# Patient Record
Sex: FEMALE | Race: BLACK OR AFRICAN AMERICAN | NOT HISPANIC OR LATINO | URBAN - METROPOLITAN AREA
[De-identification: names, ages, dates, MRNs, and addresses within clinical notes are randomized per-mention and may not be internally consistent; named-entity substitution may affect disease eponyms.]

---

## 2020-12-08 ENCOUNTER — EMERGENCY (EMERGENCY)
Facility: HOSPITAL | Age: 35
LOS: 0 days | Discharge: HOME | End: 2020-12-08
Attending: STUDENT IN AN ORGANIZED HEALTH CARE EDUCATION/TRAINING PROGRAM
Payer: COMMERCIAL

## 2020-12-08 VITALS
DIASTOLIC BLOOD PRESSURE: 82 MMHG | RESPIRATION RATE: 20 BRPM | SYSTOLIC BLOOD PRESSURE: 133 MMHG | OXYGEN SATURATION: 98 % | HEART RATE: 88 BPM | TEMPERATURE: 98 F

## 2020-12-08 VITALS
TEMPERATURE: 98 F | SYSTOLIC BLOOD PRESSURE: 112 MMHG | DIASTOLIC BLOOD PRESSURE: 75 MMHG | OXYGEN SATURATION: 97 % | HEART RATE: 79 BPM | WEIGHT: 229.94 LBS | RESPIRATION RATE: 18 BRPM | HEIGHT: 65 IN

## 2020-12-08 DIAGNOSIS — R35.8 OTHER POLYURIA: ICD-10-CM

## 2020-12-08 DIAGNOSIS — E11.9 TYPE 2 DIABETES MELLITUS WITHOUT COMPLICATIONS: ICD-10-CM

## 2020-12-08 DIAGNOSIS — E11.65 TYPE 2 DIABETES MELLITUS WITH HYPERGLYCEMIA: ICD-10-CM

## 2020-12-08 DIAGNOSIS — R63.1 POLYDIPSIA: ICD-10-CM

## 2020-12-08 DIAGNOSIS — Z79.84 LONG TERM (CURRENT) USE OF ORAL HYPOGLYCEMIC DRUGS: ICD-10-CM

## 2020-12-08 LAB
ACETONE SERPL-MCNC: ABNORMAL
ALBUMIN SERPL ELPH-MCNC: 3.8 G/DL — SIGNIFICANT CHANGE UP (ref 3.3–5)
ALP SERPL-CCNC: 101 U/L — SIGNIFICANT CHANGE UP (ref 40–120)
ALT FLD-CCNC: 25 U/L — SIGNIFICANT CHANGE UP (ref 12–78)
AMYLASE P1 CFR SERPL: 63 U/L — SIGNIFICANT CHANGE UP (ref 25–115)
ANION GAP SERPL CALC-SCNC: 9 MMOL/L — SIGNIFICANT CHANGE UP (ref 5–17)
APTT BLD: 30.4 SEC — SIGNIFICANT CHANGE UP (ref 27.5–35.5)
AST SERPL-CCNC: 17 U/L — SIGNIFICANT CHANGE UP (ref 15–37)
BASOPHILS # BLD AUTO: 0.03 K/UL — SIGNIFICANT CHANGE UP (ref 0–0.2)
BASOPHILS NFR BLD AUTO: 0.5 % — SIGNIFICANT CHANGE UP (ref 0–2)
BILIRUB SERPL-MCNC: 0.3 MG/DL — SIGNIFICANT CHANGE UP (ref 0.2–1.2)
BUN SERPL-MCNC: 9 MG/DL — SIGNIFICANT CHANGE UP (ref 7–23)
CALCIUM SERPL-MCNC: 9.3 MG/DL — SIGNIFICANT CHANGE UP (ref 8.5–10.1)
CHLORIDE SERPL-SCNC: 99 MMOL/L — SIGNIFICANT CHANGE UP (ref 96–108)
CO2 SERPL-SCNC: 27 MMOL/L — SIGNIFICANT CHANGE UP (ref 22–31)
CREAT SERPL-MCNC: 0.76 MG/DL — SIGNIFICANT CHANGE UP (ref 0.5–1.3)
EOSINOPHIL # BLD AUTO: 0.05 K/UL — SIGNIFICANT CHANGE UP (ref 0–0.5)
EOSINOPHIL NFR BLD AUTO: 0.9 % — SIGNIFICANT CHANGE UP (ref 0–6)
GLUCOSE BLDC GLUCOMTR-MCNC: 184 MG/DL — HIGH (ref 70–99)
GLUCOSE BLDC GLUCOMTR-MCNC: 212 MG/DL — HIGH (ref 70–99)
GLUCOSE SERPL-MCNC: 276 MG/DL — HIGH (ref 70–99)
HCT VFR BLD CALC: 41 % — SIGNIFICANT CHANGE UP (ref 34.5–45)
HGB BLD-MCNC: 13.6 G/DL — SIGNIFICANT CHANGE UP (ref 11.5–15.5)
IMM GRANULOCYTES NFR BLD AUTO: 0 % — SIGNIFICANT CHANGE UP (ref 0–1.5)
INR BLD: 1.09 RATIO — SIGNIFICANT CHANGE UP (ref 0.88–1.16)
LIDOCAIN IGE QN: 148 U/L — SIGNIFICANT CHANGE UP (ref 73–393)
LYMPHOCYTES # BLD AUTO: 2.88 K/UL — SIGNIFICANT CHANGE UP (ref 1–3.3)
LYMPHOCYTES # BLD AUTO: 50.3 % — HIGH (ref 13–44)
MCHC RBC-ENTMCNC: 29.8 PG — SIGNIFICANT CHANGE UP (ref 27–34)
MCHC RBC-ENTMCNC: 33.2 GM/DL — SIGNIFICANT CHANGE UP (ref 32–36)
MCV RBC AUTO: 89.7 FL — SIGNIFICANT CHANGE UP (ref 80–100)
MONOCYTES # BLD AUTO: 0.34 K/UL — SIGNIFICANT CHANGE UP (ref 0–0.9)
MONOCYTES NFR BLD AUTO: 5.9 % — SIGNIFICANT CHANGE UP (ref 2–14)
NEUTROPHILS # BLD AUTO: 2.42 K/UL — SIGNIFICANT CHANGE UP (ref 1.8–7.4)
NEUTROPHILS NFR BLD AUTO: 42.4 % — LOW (ref 43–77)
NRBC # BLD: 0 /100 WBCS — SIGNIFICANT CHANGE UP (ref 0–0)
PLATELET # BLD AUTO: 304 K/UL — SIGNIFICANT CHANGE UP (ref 150–400)
POTASSIUM SERPL-MCNC: 4.4 MMOL/L — SIGNIFICANT CHANGE UP (ref 3.5–5.3)
POTASSIUM SERPL-SCNC: 4.4 MMOL/L — SIGNIFICANT CHANGE UP (ref 3.5–5.3)
PROT SERPL-MCNC: 7.9 GM/DL — SIGNIFICANT CHANGE UP (ref 6–8.3)
PROTHROM AB SERPL-ACNC: 12.6 SEC — SIGNIFICANT CHANGE UP (ref 10.6–13.6)
RBC # BLD: 4.57 M/UL — SIGNIFICANT CHANGE UP (ref 3.8–5.2)
RBC # FLD: 12.2 % — SIGNIFICANT CHANGE UP (ref 10.3–14.5)
SODIUM SERPL-SCNC: 135 MMOL/L — SIGNIFICANT CHANGE UP (ref 135–145)
WBC # BLD: 5.72 K/UL — SIGNIFICANT CHANGE UP (ref 3.8–10.5)
WBC # FLD AUTO: 5.72 K/UL — SIGNIFICANT CHANGE UP (ref 3.8–10.5)

## 2020-12-08 PROCEDURE — 99284 EMERGENCY DEPT VISIT MOD MDM: CPT

## 2020-12-08 RX ORDER — SODIUM CHLORIDE 9 MG/ML
1000 INJECTION INTRAMUSCULAR; INTRAVENOUS; SUBCUTANEOUS ONCE
Refills: 0 | Status: COMPLETED | OUTPATIENT
Start: 2020-12-08 | End: 2020-12-08

## 2020-12-08 RX ORDER — INSULIN HUMAN 100 [IU]/ML
4 INJECTION, SOLUTION SUBCUTANEOUS ONCE
Refills: 0 | Status: COMPLETED | OUTPATIENT
Start: 2020-12-08 | End: 2020-12-08

## 2020-12-08 RX ORDER — METFORMIN HYDROCHLORIDE 850 MG/1
1 TABLET ORAL
Qty: 0 | Refills: 0 | DISCHARGE

## 2020-12-08 RX ORDER — METFORMIN HYDROCHLORIDE 850 MG/1
1 TABLET ORAL
Qty: 60 | Refills: 0
Start: 2020-12-08 | End: 2021-01-06

## 2020-12-08 RX ADMIN — INSULIN HUMAN 4 UNIT(S): 100 INJECTION, SOLUTION SUBCUTANEOUS at 18:12

## 2020-12-08 RX ADMIN — SODIUM CHLORIDE 1000 MILLILITER(S): 9 INJECTION INTRAMUSCULAR; INTRAVENOUS; SUBCUTANEOUS at 15:09

## 2020-12-08 NOTE — ED PROVIDER NOTE - OBJECTIVE STATEMENT
35 year old female w/PMH of prediabetes presents to the ED for hyperglycemia. Pt sent from urgent care for elevated blood glucose. Pt mother checked blood sugar this morning measured at 398, pt states for the past x1 week experiencing polyuria, polydipsia, and weakness. Pt took Metformin before arrival in ED. Denies fever/chills, cough, SOB, CP, abdominal pain or N/V/D. Social EtOH use, denies tobacco/illicit substance use.

## 2020-12-08 NOTE — ED PROVIDER NOTE - CONSTITUTIONAL, MLM
normal... Well appearing, awake, alert, oriented to person, place, time/situation and in no apparent distress. Well appearing, obese female, awake, alert, oriented to person, place, time/situation and in no apparent distress.

## 2020-12-08 NOTE — ED PROVIDER NOTE - CLINICAL SUMMARY MEDICAL DECISION MAKING FREE TEXT BOX
pt presented c/o one week h/o symptoms of hyperglycemia, was told to be borderline diabetic in the past, pt hydrated and given insulin, sent home with metformin and referred to dr black

## 2020-12-08 NOTE — ED PROVIDER NOTE - PATIENT PORTAL LINK FT
You can access the FollowMyHealth Patient Portal offered by [facility] by registering at the following website: [email or web address]. By joining [facility]’s FollowMyHealth portal, you will also be able to view your health information using other applications (apps) compatible with our system.

## 2020-12-08 NOTE — ED ADULT NURSE NOTE - OBJECTIVE STATEMENT
Went to urgent care today because mother checked FS at 398 and sent her to urgent care. Mom gave pt a Metformin. Pt experienced diaphoresis today. X1 week with polyuria, polyphagia but thought it was related to her menses. Yesterday began with blurry vision. No nausea. No vomiting.

## 2024-12-16 ENCOUNTER — EMERGENCY (EMERGENCY)
Facility: HOSPITAL | Age: 39
LOS: 0 days | Discharge: HOME | End: 2024-12-16
Attending: STUDENT IN AN ORGANIZED HEALTH CARE EDUCATION/TRAINING PROGRAM
Payer: COMMERCIAL

## 2024-12-16 VITALS
WEIGHT: 210.1 LBS | OXYGEN SATURATION: 100 % | TEMPERATURE: 98 F | SYSTOLIC BLOOD PRESSURE: 121 MMHG | HEART RATE: 78 BPM | RESPIRATION RATE: 18 BRPM | HEIGHT: 65 IN | DIASTOLIC BLOOD PRESSURE: 77 MMHG

## 2024-12-16 VITALS
OXYGEN SATURATION: 98 % | DIASTOLIC BLOOD PRESSURE: 67 MMHG | TEMPERATURE: 98 F | RESPIRATION RATE: 18 BRPM | SYSTOLIC BLOOD PRESSURE: 101 MMHG | HEART RATE: 70 BPM

## 2024-12-16 DIAGNOSIS — R10.30 LOWER ABDOMINAL PAIN, UNSPECIFIED: ICD-10-CM

## 2024-12-16 DIAGNOSIS — Z3A.01 LESS THAN 8 WEEKS GESTATION OF PREGNANCY: ICD-10-CM

## 2024-12-16 DIAGNOSIS — O20.9 HEMORRHAGE IN EARLY PREGNANCY, UNSPECIFIED: ICD-10-CM

## 2024-12-16 DIAGNOSIS — O24.911 UNSPECIFIED DIABETES MELLITUS IN PREGNANCY, FIRST TRIMESTER: ICD-10-CM

## 2024-12-16 DIAGNOSIS — O99.891 OTHER SPECIFIED DISEASES AND CONDITIONS COMPLICATING PREGNANCY: ICD-10-CM

## 2024-12-16 DIAGNOSIS — N93.9 ABNORMAL UTERINE AND VAGINAL BLEEDING, UNSPECIFIED: ICD-10-CM

## 2024-12-16 LAB
ALBUMIN SERPL ELPH-MCNC: 3.2 G/DL — LOW (ref 3.3–5)
ALP SERPL-CCNC: 72 U/L — SIGNIFICANT CHANGE UP (ref 40–120)
ALT FLD-CCNC: 15 U/L — SIGNIFICANT CHANGE UP (ref 12–78)
ANION GAP SERPL CALC-SCNC: 8 MMOL/L — SIGNIFICANT CHANGE UP (ref 5–17)
APPEARANCE UR: ABNORMAL
AST SERPL-CCNC: 13 U/L — LOW (ref 15–37)
BACTERIA # UR AUTO: ABNORMAL /HPF
BASOPHILS # BLD AUTO: 0.02 K/UL — SIGNIFICANT CHANGE UP (ref 0–0.2)
BASOPHILS NFR BLD AUTO: 0.3 % — SIGNIFICANT CHANGE UP (ref 0–2)
BILIRUB SERPL-MCNC: 0.2 MG/DL — SIGNIFICANT CHANGE UP (ref 0.2–1.2)
BILIRUB UR-MCNC: NEGATIVE — SIGNIFICANT CHANGE UP
BLD GP AB SCN SERPL QL: SIGNIFICANT CHANGE UP
BUN SERPL-MCNC: 10 MG/DL — SIGNIFICANT CHANGE UP (ref 7–23)
CALCIUM SERPL-MCNC: 9.2 MG/DL — SIGNIFICANT CHANGE UP (ref 8.5–10.1)
CHLORIDE SERPL-SCNC: 103 MMOL/L — SIGNIFICANT CHANGE UP (ref 96–108)
CO2 SERPL-SCNC: 24 MMOL/L — SIGNIFICANT CHANGE UP (ref 22–31)
COLOR SPEC: ABNORMAL
CREAT SERPL-MCNC: 0.79 MG/DL — SIGNIFICANT CHANGE UP (ref 0.5–1.3)
DIFF PNL FLD: ABNORMAL
EGFR: 96 ML/MIN/1.73M2 — SIGNIFICANT CHANGE UP
EOSINOPHIL # BLD AUTO: 0.03 K/UL — SIGNIFICANT CHANGE UP (ref 0–0.5)
EOSINOPHIL NFR BLD AUTO: 0.5 % — SIGNIFICANT CHANGE UP (ref 0–6)
EPI CELLS # UR: PRESENT
GLUCOSE SERPL-MCNC: 117 MG/DL — HIGH (ref 70–99)
GLUCOSE UR QL: NEGATIVE MG/DL — SIGNIFICANT CHANGE UP
HCG SERPL-ACNC: HIGH MIU/ML
HCT VFR BLD CALC: 37.8 % — SIGNIFICANT CHANGE UP (ref 34.5–45)
HGB BLD-MCNC: 12.7 G/DL — SIGNIFICANT CHANGE UP (ref 11.5–15.5)
IMM GRANULOCYTES NFR BLD AUTO: 0.2 % — SIGNIFICANT CHANGE UP (ref 0–0.9)
KETONES UR-MCNC: NEGATIVE MG/DL — SIGNIFICANT CHANGE UP
LEUKOCYTE ESTERASE UR-ACNC: ABNORMAL
LYMPHOCYTES # BLD AUTO: 2.72 K/UL — SIGNIFICANT CHANGE UP (ref 1–3.3)
LYMPHOCYTES # BLD AUTO: 45.5 % — HIGH (ref 13–44)
MCHC RBC-ENTMCNC: 30.8 PG — SIGNIFICANT CHANGE UP (ref 27–34)
MCHC RBC-ENTMCNC: 33.6 G/DL — SIGNIFICANT CHANGE UP (ref 32–36)
MCV RBC AUTO: 91.5 FL — SIGNIFICANT CHANGE UP (ref 80–100)
MONOCYTES # BLD AUTO: 0.41 K/UL — SIGNIFICANT CHANGE UP (ref 0–0.9)
MONOCYTES NFR BLD AUTO: 6.9 % — SIGNIFICANT CHANGE UP (ref 2–14)
NEUTROPHILS # BLD AUTO: 2.79 K/UL — SIGNIFICANT CHANGE UP (ref 1.8–7.4)
NEUTROPHILS NFR BLD AUTO: 46.6 % — SIGNIFICANT CHANGE UP (ref 43–77)
NITRITE UR-MCNC: NEGATIVE — SIGNIFICANT CHANGE UP
NRBC # BLD: 0 /100 WBCS — SIGNIFICANT CHANGE UP (ref 0–0)
PH UR: 6 — SIGNIFICANT CHANGE UP (ref 5–8)
PLATELET # BLD AUTO: 302 K/UL — SIGNIFICANT CHANGE UP (ref 150–400)
POTASSIUM SERPL-MCNC: 3.7 MMOL/L — SIGNIFICANT CHANGE UP (ref 3.5–5.3)
POTASSIUM SERPL-SCNC: 3.7 MMOL/L — SIGNIFICANT CHANGE UP (ref 3.5–5.3)
PROT SERPL-MCNC: 7.8 GM/DL — SIGNIFICANT CHANGE UP (ref 6–8.3)
PROT UR-MCNC: 100 MG/DL
RBC # BLD: 4.13 M/UL — SIGNIFICANT CHANGE UP (ref 3.8–5.2)
RBC # FLD: 12.3 % — SIGNIFICANT CHANGE UP (ref 10.3–14.5)
RBC CASTS # UR COMP ASSIST: ABNORMAL /HPF
SODIUM SERPL-SCNC: 135 MMOL/L — SIGNIFICANT CHANGE UP (ref 135–145)
SP GR SPEC: 1.02 — SIGNIFICANT CHANGE UP (ref 1–1.03)
UROBILINOGEN FLD QL: 1 MG/DL — SIGNIFICANT CHANGE UP (ref 0.2–1)
WBC # BLD: 5.98 K/UL — SIGNIFICANT CHANGE UP (ref 3.8–10.5)
WBC # FLD AUTO: 5.98 K/UL — SIGNIFICANT CHANGE UP (ref 3.8–10.5)
WBC UR QL: 5 /HPF — SIGNIFICANT CHANGE UP (ref 0–5)

## 2024-12-16 PROCEDURE — 99284 EMERGENCY DEPT VISIT MOD MDM: CPT

## 2024-12-16 PROCEDURE — 76801 OB US < 14 WKS SINGLE FETUS: CPT | Mod: 26

## 2024-12-16 RX ORDER — NITROFURANTOIN 100 MG/1
1 CAPSULE ORAL
Qty: 14 | Refills: 0
Start: 2024-12-16 | End: 2024-12-22

## 2024-12-16 RX ORDER — NITROFURANTOIN 100 MG/1
100 CAPSULE ORAL ONCE
Refills: 0 | Status: COMPLETED | OUTPATIENT
Start: 2024-12-16 | End: 2024-12-16

## 2024-12-16 RX ORDER — SODIUM CHLORIDE 9 MG/ML
1000 INJECTION, SOLUTION INTRAMUSCULAR; INTRAVENOUS; SUBCUTANEOUS ONCE
Refills: 0 | Status: COMPLETED | OUTPATIENT
Start: 2024-12-16 | End: 2024-12-16

## 2024-12-16 RX ADMIN — SODIUM CHLORIDE 1000 MILLILITER(S): 9 INJECTION, SOLUTION INTRAMUSCULAR; INTRAVENOUS; SUBCUTANEOUS at 23:57

## 2024-12-16 RX ADMIN — SODIUM CHLORIDE 1000 MILLILITER(S): 9 INJECTION, SOLUTION INTRAMUSCULAR; INTRAVENOUS; SUBCUTANEOUS at 20:44

## 2024-12-16 NOTE — ED PROVIDER NOTE - NSFOLLOWUPINSTRUCTIONS_ED_ALL_ED_FT
Follow up with your specialist tomorrow  Take macrobid as directed for the bacteria seen in your specialist   Return for worsening symptoms

## 2024-12-16 NOTE — ED PROVIDER NOTE - CLINICAL SUMMARY MEDICAL DECISION MAKING FREE TEXT BOX
39 year old female  with h/o diabetes presents today c/o vaginal bleed today, pt is currently  seven weeks gestation, reports that she was in the process of harvesting her eggs when she became pregnant with twin gestation. She was recently see by her reproductive endocrinologist who she say follows her for the first nine weeks then discharges her to an Ob/gyn. She does have an appointment on Tuesday, her last sono was normal, today pt reports having lower abd cramping, felt a wet, when she checked she noticed a clot.  While waiting in the ER she noticed bleeding again and the cramping (-) nausea or vomiting.  On exam pt is awake, alert and oriented x 3, well appearing,  +tenderness to her lower abd without guarding or rebound, pelvic exam shows blood 39 year old female  with h/o diabetes presents today c/o vaginal bleed today, pt is currently  seven weeks gestation, reports that she was in the process of harvesting her eggs when she became pregnant with twin gestation. She was recently see by her reproductive endocrinologist who she say follows her for the first nine weeks then discharges her to an Ob/gyn. She does have an appointment on Tuesday, her last sono was normal, today pt reports having lower abd cramping, felt a wet, when she checked she noticed a clot.  While waiting in the ER she noticed bleeding again and the cramping (-) nausea or vomiting.  On exam pt is awake, alert and oriented x 3, well appearing,  +tenderness to her lower abd without guarding or rebound, pelvic exam shows blood, clot seen, os closed (-) adnexal tenderness +mild lower abd tenderness (-) guarding or rebound.  Labs ordered, ua, ivf, and sono to rule out miscarriage, uti, ectopic.  Will reassess and dispo    labs reviewed, few bacteria note with blood, will treat with antibiotics, beta is >102,000, pt does not know what her last beta was, copies given for her physician, pt will call tmr    sono shows twin gestation, twin a with no fetal heart beat and small subchorionic hemorrhage seen, twin b cardiac motion seen, concern for failed pregnancy  reports given to her, pt given instructions for pelvic rest until she is seen by her physician

## 2024-12-16 NOTE — ED ADULT NURSE NOTE - NSFALLUNIVINTERV_ED_ALL_ED
Bed/Stretcher in lowest position, wheels locked, appropriate side rails in place/Call bell, personal items and telephone in reach/Instruct patient to call for assistance before getting out of bed/chair/stretcher/Non-slip footwear applied when patient is off stretcher/[place] to call system/Physically safe environment - no spills, clutter or unnecessary equipment/Purposeful proactive rounding/Room/bathroom lighting operational, light cord in reach

## 2024-12-16 NOTE — ED ADULT TRIAGE NOTE - CHIEF COMPLAINT QUOTE
pt is 7 weeks pregnant , c/o vaginal bleeding and lower abdominal pain since this morning. history of dm.

## 2024-12-16 NOTE — ED ADULT NURSE NOTE - OBJECTIVE STATEMENT
Pt is  7 weeks pregnant with twins c/o abd pain and vaginal bleeding since 4pm. Pt reports intermittent lower abd cramping r/t to the groin, vaginal bleeding beginning today. Pt states she is passing clots. Being monitored weekly by OB. Hx DM

## 2024-12-17 PROBLEM — R73.03 PREDIABETES: Chronic | Status: ACTIVE | Noted: 2020-12-08

## 2024-12-17 RX ADMIN — NITROFURANTOIN 100 MILLIGRAM(S): 100 CAPSULE ORAL at 00:00

## 2025-01-23 ENCOUNTER — APPOINTMENT (OUTPATIENT)
Dept: ANTEPARTUM | Facility: CLINIC | Age: 40
End: 2025-01-23

## 2025-01-23 ENCOUNTER — ASOB RESULT (OUTPATIENT)
Age: 40
End: 2025-01-23

## 2025-01-23 ENCOUNTER — APPOINTMENT (OUTPATIENT)
Dept: MATERNAL FETAL MEDICINE | Facility: CLINIC | Age: 40
End: 2025-01-23

## 2025-01-23 PROBLEM — Z00.00 ENCOUNTER FOR PREVENTIVE HEALTH EXAMINATION: Status: ACTIVE | Noted: 2025-01-23

## 2025-02-21 ENCOUNTER — ASOB RESULT (OUTPATIENT)
Age: 40
End: 2025-02-21

## 2025-02-21 ENCOUNTER — TRANSCRIPTION ENCOUNTER (OUTPATIENT)
Age: 40
End: 2025-02-21

## 2025-02-21 ENCOUNTER — APPOINTMENT (OUTPATIENT)
Dept: ANTEPARTUM | Facility: CLINIC | Age: 40
End: 2025-02-21

## 2025-02-21 PROCEDURE — 76805 OB US >/= 14 WKS SNGL FETUS: CPT

## 2025-03-21 ENCOUNTER — ASOB RESULT (OUTPATIENT)
Age: 40
End: 2025-03-21

## 2025-03-21 ENCOUNTER — APPOINTMENT (OUTPATIENT)
Dept: ANTEPARTUM | Facility: CLINIC | Age: 40
End: 2025-03-21
Payer: COMMERCIAL

## 2025-03-21 PROCEDURE — 76811 OB US DETAILED SNGL FETUS: CPT

## 2025-03-28 ENCOUNTER — APPOINTMENT (OUTPATIENT)
Dept: PEDIATRIC CARDIOLOGY | Facility: CLINIC | Age: 40
End: 2025-03-28
Payer: COMMERCIAL

## 2025-03-28 PROCEDURE — 76821 MIDDLE CEREBRAL ARTERY ECHO: CPT

## 2025-03-28 PROCEDURE — 93325 DOPPLER ECHO COLOR FLOW MAPG: CPT | Mod: 59

## 2025-03-28 PROCEDURE — 76820 UMBILICAL ARTERY ECHO: CPT

## 2025-03-28 PROCEDURE — 99203 OFFICE O/P NEW LOW 30 MIN: CPT | Mod: 25

## 2025-03-28 PROCEDURE — 76827 ECHO EXAM OF FETAL HEART: CPT

## 2025-03-28 PROCEDURE — 76825 ECHO EXAM OF FETAL HEART: CPT

## 2025-04-07 ENCOUNTER — ASOB RESULT (OUTPATIENT)
Age: 40
End: 2025-04-07

## 2025-04-07 ENCOUNTER — APPOINTMENT (OUTPATIENT)
Dept: MATERNAL FETAL MEDICINE | Facility: CLINIC | Age: 40
End: 2025-04-07
Payer: COMMERCIAL

## 2025-04-07 DIAGNOSIS — O24.112 PRE-EXISTING TYPE 2 DIABETES MELLITUS, IN PREGNANCY, SECOND TRIMESTER: ICD-10-CM

## 2025-04-07 PROCEDURE — G0108 DIAB MANAGE TRN  PER INDIV: CPT | Mod: 95

## 2025-04-08 RX ORDER — BLOOD-GLUCOSE SENSOR
EACH MISCELLANEOUS
Qty: 3 | Refills: 1 | Status: ACTIVE | COMMUNITY
Start: 2025-04-07 | End: 1900-01-01

## 2025-04-08 RX ORDER — BLOOD-GLUCOSE METER
KIT MISCELLANEOUS 4 TIMES DAILY
Qty: 2 | Refills: 2 | Status: ACTIVE | COMMUNITY
Start: 2025-04-07 | End: 1900-01-01

## 2025-04-08 RX ORDER — BLOOD-GLUCOSE METER
W/DEVICE KIT MISCELLANEOUS
Qty: 1 | Refills: 1 | Status: ACTIVE | COMMUNITY
Start: 2025-04-07 | End: 1900-01-01

## 2025-04-08 RX ORDER — LANCETS 33 GAUGE
EACH MISCELLANEOUS
Qty: 1 | Refills: 2 | Status: ACTIVE | COMMUNITY
Start: 2025-04-07 | End: 1900-01-01

## 2025-04-15 ENCOUNTER — APPOINTMENT (OUTPATIENT)
Dept: MATERNAL FETAL MEDICINE | Facility: CLINIC | Age: 40
End: 2025-04-15

## 2025-04-15 ENCOUNTER — ASOB RESULT (OUTPATIENT)
Age: 40
End: 2025-04-15

## 2025-04-17 ENCOUNTER — APPOINTMENT (OUTPATIENT)
Dept: MATERNAL FETAL MEDICINE | Facility: CLINIC | Age: 40
End: 2025-04-17
Payer: COMMERCIAL

## 2025-04-17 ENCOUNTER — APPOINTMENT (OUTPATIENT)
Dept: MATERNAL FETAL MEDICINE | Facility: CLINIC | Age: 40
End: 2025-04-17

## 2025-04-17 ENCOUNTER — ASOB RESULT (OUTPATIENT)
Age: 40
End: 2025-04-17

## 2025-04-17 PROCEDURE — G0108 DIAB MANAGE TRN  PER INDIV: CPT | Mod: 95

## 2025-04-17 RX ORDER — PEN NEEDLE, DIABETIC 32GX 5/32"
32G X 4 MM NEEDLE, DISPOSABLE MISCELLANEOUS
Qty: 1 | Refills: 1 | Status: ACTIVE | COMMUNITY
Start: 2025-04-17 | End: 1900-01-01

## 2025-04-17 RX ORDER — INSULIN HUMAN 100 [IU]/ML
100 INJECTION, SUSPENSION SUBCUTANEOUS
Qty: 1 | Refills: 1 | Status: ACTIVE | COMMUNITY
Start: 2025-04-17 | End: 1900-01-01

## 2025-04-17 RX ORDER — ALCOHOL ANTISEPTIC PADS
PADS, MEDICATED (EA) TOPICAL
Qty: 1 | Refills: 1 | Status: ACTIVE | COMMUNITY
Start: 2025-04-17 | End: 1900-01-01

## 2025-05-01 ENCOUNTER — APPOINTMENT (OUTPATIENT)
Dept: MATERNAL FETAL MEDICINE | Facility: CLINIC | Age: 40
End: 2025-05-01
Payer: COMMERCIAL

## 2025-05-01 ENCOUNTER — ASOB RESULT (OUTPATIENT)
Age: 40
End: 2025-05-01

## 2025-05-01 PROCEDURE — G0108 DIAB MANAGE TRN  PER INDIV: CPT | Mod: 95

## 2025-05-01 PROCEDURE — 99214 OFFICE O/P EST MOD 30 MIN: CPT | Mod: 95

## 2025-05-01 RX ORDER — INSULIN LISPRO 100 [IU]/ML
100 INJECTION, SOLUTION INTRAVENOUS; SUBCUTANEOUS
Qty: 1 | Refills: 1 | Status: ACTIVE | COMMUNITY
Start: 2025-05-01 | End: 1900-01-01

## 2025-05-02 RX ORDER — BLOOD-GLUCOSE SENSOR
EACH MISCELLANEOUS
Qty: 6 | Refills: 2 | Status: ACTIVE | COMMUNITY
Start: 2025-05-02 | End: 1900-01-01

## 2025-05-06 ENCOUNTER — NON-APPOINTMENT (OUTPATIENT)
Age: 40
End: 2025-05-06

## 2025-05-16 ENCOUNTER — ASOB RESULT (OUTPATIENT)
Age: 40
End: 2025-05-16

## 2025-05-16 ENCOUNTER — APPOINTMENT (OUTPATIENT)
Dept: ANTEPARTUM | Facility: CLINIC | Age: 40
End: 2025-05-16

## 2025-05-16 PROCEDURE — 76819 FETAL BIOPHYS PROFIL W/O NST: CPT | Mod: 59

## 2025-05-16 PROCEDURE — 76816 OB US FOLLOW-UP PER FETUS: CPT

## 2025-05-20 ENCOUNTER — APPOINTMENT (OUTPATIENT)
Dept: MATERNAL FETAL MEDICINE | Facility: CLINIC | Age: 40
End: 2025-05-20

## 2025-05-20 ENCOUNTER — NON-APPOINTMENT (OUTPATIENT)
Age: 40
End: 2025-05-20

## 2025-05-29 DIAGNOSIS — Z82.3 FAMILY HISTORY OF STROKE: ICD-10-CM

## 2025-05-29 DIAGNOSIS — Z82.49 FAMILY HISTORY OF ISCHEMIC HEART DISEASE AND OTHER DISEASES OF THE CIRCULATORY SYSTEM: ICD-10-CM

## 2025-05-29 DIAGNOSIS — Z83.3 FAMILY HISTORY OF DIABETES MELLITUS: ICD-10-CM

## 2025-05-29 DIAGNOSIS — O24.919 UNSPECIFIED DIABETES MELLITUS IN PREGNANCY, UNSPECIFIED TRIMESTER: ICD-10-CM

## 2025-05-29 DIAGNOSIS — Z3A.31 31 WEEKS GESTATION OF PREGNANCY: ICD-10-CM

## 2025-05-29 DIAGNOSIS — Z78.9 OTHER SPECIFIED HEALTH STATUS: ICD-10-CM

## 2025-05-30 ENCOUNTER — NON-APPOINTMENT (OUTPATIENT)
Age: 40
End: 2025-05-30

## 2025-05-30 ENCOUNTER — APPOINTMENT (OUTPATIENT)
Dept: CARDIOLOGY | Facility: CLINIC | Age: 40
End: 2025-05-30
Payer: COMMERCIAL

## 2025-05-30 VITALS
HEART RATE: 84 BPM | WEIGHT: 236 LBS | DIASTOLIC BLOOD PRESSURE: 66 MMHG | BODY MASS INDEX: 39.32 KG/M2 | SYSTOLIC BLOOD PRESSURE: 98 MMHG | OXYGEN SATURATION: 99 % | HEIGHT: 65 IN | TEMPERATURE: 98.4 F

## 2025-05-30 DIAGNOSIS — O24.112 PRE-EXISTING TYPE 2 DIABETES MELLITUS, IN PREGNANCY, SECOND TRIMESTER: ICD-10-CM

## 2025-05-30 PROCEDURE — 99203 OFFICE O/P NEW LOW 30 MIN: CPT | Mod: 25

## 2025-05-30 PROCEDURE — 93000 ELECTROCARDIOGRAM COMPLETE: CPT

## 2025-06-03 ENCOUNTER — APPOINTMENT (OUTPATIENT)
Dept: MATERNAL FETAL MEDICINE | Facility: CLINIC | Age: 40
End: 2025-06-03

## 2025-06-03 PROCEDURE — 99215 OFFICE O/P EST HI 40 MIN: CPT | Mod: 95

## 2025-06-05 ENCOUNTER — APPOINTMENT (OUTPATIENT)
Dept: MATERNAL FETAL MEDICINE | Facility: CLINIC | Age: 40
End: 2025-06-05

## 2025-06-05 ENCOUNTER — ASOB RESULT (OUTPATIENT)
Age: 40
End: 2025-06-05

## 2025-06-05 PROCEDURE — G0108 DIAB MANAGE TRN  PER INDIV: CPT | Mod: 95

## 2025-06-12 ENCOUNTER — APPOINTMENT (OUTPATIENT)
Dept: MATERNAL FETAL MEDICINE | Facility: CLINIC | Age: 40
End: 2025-06-12

## 2025-06-13 ENCOUNTER — ASOB RESULT (OUTPATIENT)
Age: 40
End: 2025-06-13

## 2025-06-13 ENCOUNTER — APPOINTMENT (OUTPATIENT)
Dept: ANTEPARTUM | Facility: CLINIC | Age: 40
End: 2025-06-13

## 2025-06-13 PROCEDURE — 76818 FETAL BIOPHYS PROFILE W/NST: CPT | Mod: 59

## 2025-06-13 PROCEDURE — 76816 OB US FOLLOW-UP PER FETUS: CPT

## 2025-06-19 ENCOUNTER — NON-APPOINTMENT (OUTPATIENT)
Age: 40
End: 2025-06-19

## 2025-06-19 ENCOUNTER — ASOB RESULT (OUTPATIENT)
Age: 40
End: 2025-06-19

## 2025-06-19 ENCOUNTER — APPOINTMENT (OUTPATIENT)
Dept: ANTEPARTUM | Facility: CLINIC | Age: 40
End: 2025-06-19
Payer: COMMERCIAL

## 2025-06-19 PROCEDURE — 76818 FETAL BIOPHYS PROFILE W/NST: CPT

## 2025-06-27 ENCOUNTER — APPOINTMENT (OUTPATIENT)
Dept: MATERNAL FETAL MEDICINE | Facility: CLINIC | Age: 40
End: 2025-06-27

## 2025-06-27 ENCOUNTER — APPOINTMENT (OUTPATIENT)
Dept: ANTEPARTUM | Facility: CLINIC | Age: 40
End: 2025-06-27
Payer: COMMERCIAL

## 2025-06-27 ENCOUNTER — ASOB RESULT (OUTPATIENT)
Age: 40
End: 2025-06-27

## 2025-06-27 PROCEDURE — 76818 FETAL BIOPHYS PROFILE W/NST: CPT

## 2025-06-27 PROCEDURE — 99214 OFFICE O/P EST MOD 30 MIN: CPT | Mod: 25,95

## 2025-07-03 ENCOUNTER — ASOB RESULT (OUTPATIENT)
Age: 40
End: 2025-07-03

## 2025-07-03 ENCOUNTER — APPOINTMENT (OUTPATIENT)
Dept: ANTEPARTUM | Facility: CLINIC | Age: 40
End: 2025-07-03

## 2025-07-03 PROCEDURE — 76819 FETAL BIOPHYS PROFIL W/O NST: CPT

## 2025-07-10 ENCOUNTER — APPOINTMENT (OUTPATIENT)
Dept: ANTEPARTUM | Facility: CLINIC | Age: 40
End: 2025-07-10

## 2025-07-14 ENCOUNTER — APPOINTMENT (OUTPATIENT)
Dept: ANTEPARTUM | Facility: CLINIC | Age: 40
End: 2025-07-14

## 2025-07-14 ENCOUNTER — ASOB RESULT (OUTPATIENT)
Age: 40
End: 2025-07-14

## 2025-07-14 ENCOUNTER — INPATIENT (INPATIENT)
Facility: HOSPITAL | Age: 40
LOS: 3 days | Discharge: HOME | End: 2025-07-18
Attending: STUDENT IN AN ORGANIZED HEALTH CARE EDUCATION/TRAINING PROGRAM | Admitting: STUDENT IN AN ORGANIZED HEALTH CARE EDUCATION/TRAINING PROGRAM
Payer: COMMERCIAL

## 2025-07-14 ENCOUNTER — APPOINTMENT (OUTPATIENT)
Dept: MATERNAL FETAL MEDICINE | Facility: CLINIC | Age: 40
End: 2025-07-14

## 2025-07-14 VITALS
OXYGEN SATURATION: 98 % | RESPIRATION RATE: 16 BRPM | TEMPERATURE: 98 F | HEART RATE: 79 BPM | DIASTOLIC BLOOD PRESSURE: 69 MMHG | SYSTOLIC BLOOD PRESSURE: 110 MMHG

## 2025-07-14 DIAGNOSIS — O26.899 OTHER SPECIFIED PREGNANCY RELATED CONDITIONS, UNSPECIFIED TRIMESTER: ICD-10-CM

## 2025-07-14 LAB
BLD GP AB SCN SERPL QL: NEGATIVE — SIGNIFICANT CHANGE UP
GLUCOSE BLDC GLUCOMTR-MCNC: 147 MG/DL — HIGH (ref 70–99)
GLUCOSE BLDC GLUCOMTR-MCNC: 62 MG/DL — LOW (ref 70–99)
GLUCOSE BLDC GLUCOMTR-MCNC: 70 MG/DL — SIGNIFICANT CHANGE UP (ref 70–99)
HCT VFR BLD CALC: 41.1 % — SIGNIFICANT CHANGE UP (ref 34.5–45)
HGB BLD-MCNC: 13.6 G/DL — SIGNIFICANT CHANGE UP (ref 11.5–15.5)
MCHC RBC-ENTMCNC: 31 PG — SIGNIFICANT CHANGE UP (ref 27–34)
MCHC RBC-ENTMCNC: 33.1 G/DL — SIGNIFICANT CHANGE UP (ref 32–36)
MCV RBC AUTO: 93.6 FL — SIGNIFICANT CHANGE UP (ref 80–100)
NRBC # BLD AUTO: 0 K/UL — SIGNIFICANT CHANGE UP (ref 0–0)
NRBC # FLD: 0 K/UL — SIGNIFICANT CHANGE UP (ref 0–0)
NRBC BLD AUTO-RTO: 0 /100 WBCS — SIGNIFICANT CHANGE UP (ref 0–0)
PLATELET # BLD AUTO: 272 K/UL — SIGNIFICANT CHANGE UP (ref 150–400)
PMV BLD: 10.5 FL — SIGNIFICANT CHANGE UP (ref 7–13)
RBC # BLD: 4.39 M/UL — SIGNIFICANT CHANGE UP (ref 3.8–5.2)
RBC # FLD: 13.5 % — SIGNIFICANT CHANGE UP (ref 10.3–14.5)
RH IG SCN BLD-IMP: POSITIVE — SIGNIFICANT CHANGE UP
RH IG SCN BLD-IMP: POSITIVE — SIGNIFICANT CHANGE UP
WBC # BLD: 5.84 K/UL — SIGNIFICANT CHANGE UP (ref 3.8–10.5)
WBC # FLD AUTO: 5.84 K/UL — SIGNIFICANT CHANGE UP (ref 3.8–10.5)

## 2025-07-14 PROCEDURE — 98968 PH1 ASSMT&MGMT NQHP 21-30: CPT

## 2025-07-14 RX ORDER — INSULIN LISPRO 100 U/ML
10 INJECTION, SOLUTION INTRAVENOUS; SUBCUTANEOUS
Refills: 0 | Status: DISCONTINUED | OUTPATIENT
Start: 2025-07-14 | End: 2025-07-15

## 2025-07-14 RX ORDER — DEXTROSE 50 % IN WATER 50 %
12.5 SYRINGE (ML) INTRAVENOUS ONCE
Refills: 0 | Status: DISCONTINUED | OUTPATIENT
Start: 2025-07-14 | End: 2025-07-15

## 2025-07-14 RX ORDER — DEXTROSE 50 % IN WATER 50 %
25 SYRINGE (ML) INTRAVENOUS ONCE
Refills: 0 | Status: DISCONTINUED | OUTPATIENT
Start: 2025-07-14 | End: 2025-07-15

## 2025-07-14 RX ORDER — DEXTROSE 50 % IN WATER 50 %
15 SYRINGE (ML) INTRAVENOUS ONCE
Refills: 0 | Status: DISCONTINUED | OUTPATIENT
Start: 2025-07-14 | End: 2025-07-15

## 2025-07-14 RX ORDER — CYANOCOBALAMIN 1000 UG/ML
1000 INJECTION INTRAMUSCULAR; SUBCUTANEOUS DAILY
Refills: 0 | Status: DISCONTINUED | OUTPATIENT
Start: 2025-07-15 | End: 2025-07-15

## 2025-07-14 RX ORDER — ASPIRIN 325 MG
162 TABLET ORAL DAILY
Refills: 0 | Status: DISCONTINUED | OUTPATIENT
Start: 2025-07-15 | End: 2025-07-15

## 2025-07-14 RX ORDER — PRENATAL 136/IRON/FOLIC ACID 27 MG-1 MG
1 TABLET ORAL
Refills: 0 | DISCHARGE

## 2025-07-14 RX ORDER — INSULIN LISPRO 100 U/ML
8 INJECTION, SOLUTION INTRAVENOUS; SUBCUTANEOUS
Refills: 0 | Status: COMPLETED | OUTPATIENT
Start: 2025-07-14 | End: 2025-07-14

## 2025-07-14 RX ORDER — PRENATAL 136/IRON/FOLIC ACID 27 MG-1 MG
1 TABLET ORAL DAILY
Refills: 0 | Status: DISCONTINUED | OUTPATIENT
Start: 2025-07-15 | End: 2025-07-15

## 2025-07-14 RX ORDER — ACETAMINOPHEN 500 MG/5ML
975 LIQUID (ML) ORAL ONCE
Refills: 0 | Status: COMPLETED | OUTPATIENT
Start: 2025-07-14 | End: 2025-07-14

## 2025-07-14 RX ORDER — SODIUM CHLORIDE 9 G/1000ML
1000 INJECTION, SOLUTION INTRAVENOUS
Refills: 0 | Status: DISCONTINUED | OUTPATIENT
Start: 2025-07-14 | End: 2025-07-15

## 2025-07-14 RX ORDER — GLUCAGON 3 MG/1
1 POWDER NASAL ONCE
Refills: 0 | Status: DISCONTINUED | OUTPATIENT
Start: 2025-07-14 | End: 2025-07-15

## 2025-07-14 RX ADMIN — Medication 500 MILLIGRAM(S): at 23:02

## 2025-07-14 RX ADMIN — Medication 22 UNIT(S): at 23:00

## 2025-07-14 RX ADMIN — INSULIN LISPRO 8 UNIT(S): 100 INJECTION, SOLUTION INTRAVENOUS; SUBCUTANEOUS at 21:16

## 2025-07-14 RX ADMIN — Medication 975 MILLIGRAM(S): at 23:02

## 2025-07-14 NOTE — OB PROVIDER TRIAGE NOTE - HISTORY OF PRESENT ILLNESS
39yo  @ 36w6d, AMA, type 2 DM, HSV 2 presented to L&D triage from office for variables on NST. Pt report irregular contractions that had started from 8AM.   She denies any LOF/VB, report positive fetal movement. She denies any SOB, palpitation, N/V.   ATU (7/3): Cephalic, ant, DALJIT 21.34, BPP 8/8    PNC complicated by:  - uncontrolled type II diabetes, on Lispro 10/10/10 and NPH 22u at bedtime/ Pt reports her insulin has been gradually up-titrated in the last few weeks/ Pt reports her glucose were controlled prior to pregnancy. Recent A1C:  6.2% on 2025 at 9 1/7 wks.  -  Hx of fibroids, HSV2 (most recent outbreak last week)   - PCOS  - Fetal echocardiogram: 3/28/25: Technically difficult examination due to suboptimal echocardiographic windows.    Patent foramen ovale and patent ductus arteriosus with right to left flow.  No further fetal or  pediatric cardiology follow up is indicated unless there is a clinical concern.  GBS    meds: Lispro 10/10/10, NPH 22u, Valtrex, PNV, ASA, B12, D3  allergy: NKDA

## 2025-07-14 NOTE — OB PROVIDER H&P - CURRENT PREGNANCY COMPLICATIONS, OB PROFILE
The following labs were labeled with appropriate pt sticker and tubed to lab:     [] Blue     [] Lavender   [] on ice  [] Green/yellow  [] Green/black [] on ice  [] Ahmad Eileen  [] on ice  [] Yellow  [] Red  [] Type/ Screen  [] ABG  [] VBG    [] COVID-19 swab    [] Rapid  [] PCR  [] Flu swab  [] Peds Viral Panel     [x] Urine Sample  [] Fecal Sample  [] Pelvic Cultures  [] Blood Cultures  [] X 2  [] STREP Cultures       Rohan Dobson RN  08/02/23 3802 AMA, Pregestational Diabetes, HSV on Valtrex/Other

## 2025-07-14 NOTE — CONSULT NOTE ADULT - ASSESSMENT
41yo  @ 36w6d presented to L&D triage from office for variables on NST MFM consulted for recommendation for timing of delivery giving uncontrolled T2DM, Variables in office.     POCT: 70, in triage    Prelim recs  - Prolonged monitoring  - BPP  - Obtain HBA1C    Malgorzata Levy PGY3  d/w Dr. Fairchild, MFM fellow     39yo  @ 36w6d presented to L&D triage from office for variables on NST. Tracing improved cat 1 reactive overnight. BPP 8/8 on bedside US. Plan as per overnight MFM on call was for conservative management.

## 2025-07-14 NOTE — OB PROVIDER H&P - ASSESSMENT
Pt is a 40 y.o  @36+6 admitted for glycemic control following limited fingerstick date provided by patient. Tracing reassuring x3 hours, for NST BID per MFM. Patient with recent HSV outbreak currently on Valtrex. Declined SSE while in LD triage despite education, citing "if I am going to get a  then what is the point". Noted lesions on labia per NP Galileo. BPP  in LD triage. Polyhydramnios noted today on BPP with DALJIT 25.87.     -Admit to L&D  -Routine labs ordered: CBC, T&S, RPR  -Carb consistent diet  -Fingersticks with bedtime, 1 hour postprandial, and fasting  -Continue home insulin regimen  -Blood transfusion consent obtained  -Induction vs surgical consent to be obtained by delivering team as indicated  -NST BID  -MFM growth sono vs consult in AM    d/w Dr. Lilia Mesa NP

## 2025-07-14 NOTE — OB PROVIDER H&P - HISTORY OF PRESENT ILLNESS
41yo  @ 36w6d, AMA, type 2 DM, HSV 2 presented to L&D triage from office for variables on NST. Pt report irregular contractions that had started from 8AM. She denies any LOF/VB, report positive fetal movement. She denies any SOB, palpitation, N/V.   ATU (7/3): Cephalic, ant, DALJIT 21.34, BPP 8/8    PNC complicated by:  - uncontrolled type II diabetes, on Lispro 10/10/10 and NPH 22u at bedtime/ Pt reports her insulin has been gradually up-titrated in the last few weeks/ Pt reports her glucose were controlled prior to pregnancy. Recent A1C:  6.2% on 2025 at 9 1/7 wks.  -  Hx of fibroids, HSV2 (most recent outbreak last week)   - PCOS  - Fetal echocardiogram: 3/28/25: Technically difficult examination due to suboptimal echocardiographic windows.    Patent foramen ovale and patent ductus arteriosus with right to left flow.  No further fetal or  pediatric cardiology follow up is indicated unless there is a clinical concern.  GBS    meds: Lispro 10/10/10, NPH 22u, Valtrex, PNV, ASA, B12, D3  allergy: NKDA

## 2025-07-14 NOTE — OB RN PATIENT PROFILE - FALL HARM RISK - UNIVERSAL INTERVENTIONS
Bed in lowest position, wheels locked, appropriate side rails in place/Call bell, personal items and telephone in reach/Instruct patient to call for assistance before getting out of bed or chair/Non-slip footwear when patient is out of bed/[place] to call system/Physically safe environment - no spills, clutter or unnecessary equipment/Purposeful Proactive Rounding/Room/bathroom lighting operational, light cord in reach

## 2025-07-14 NOTE — OB RN TRIAGE NOTE - FALL HARM RISK - UNIVERSAL INTERVENTIONS
Bed in lowest position, wheels locked, appropriate side rails in place/Call bell, personal items and telephone in reach/Instruct patient to call for assistance before getting out of bed or chair/Non-slip footwear when patient is out of bed/[place] to call system/Physically safe environment - no spills, clutter or unnecessary equipment/Purposeful Proactive Rounding/Room/bathroom lighting operational, light cord in reach

## 2025-07-14 NOTE — CONSULT NOTE ADULT - SUBJECTIVE AND OBJECTIVE BOX
TAD ORTEGA  40y  Female [id number]    HPI:  41yo  @ 36w6d presented to L&D triage from office for variables on NST. Pt also with uncontrolled type II diabetes, on Lispro 10/10/10 and NPH 22u at bedtime. Pt reports her insulin has been gradually uptitrated in the last few weeks.   Pt reports her glucose were controlled prior to pregnancy. She denies any LOF/VB. She endorses+ FM and intermittent contractions. She denies any SOB/CP/N/V/bowel or urinary chages    PNC: c/b T2DM    Fetal echocardiogram: 3/28/25: Technically difficult examination due to suboptimal echocardiographic windows.    Patent foramen ovale and patent ductus arteriosus with right to left flow.  No further fetal or  pediatric cardiology follow up is indicated unless there is a clinical concern.    Recent A1C:  6.2% on 2025 at 9 1/7 wks.    Finger stick review per diabetes educator on ()  FB-94mg/dL, 1x elevated.  PPBG Breakfast: 153, 96, 123mg/dL, 1x elevated. Took 8-10units lispro 3x.  PPBG Lunch: 158, 173, 180mg/dL, all 3x elevated when she tested. She did not test the one time she took 10units.  PPBG Dinner: 199, 192, 183mg/dL, all 3xelevated. Pt only took 8units 1x with the 192mg/dl.    Per Dr. Kim, last GS : EFW 6#4, 71%, AC 80%  ATU (7/3): Cephalic, ant, DALJIT 21.34, BPP 8/8    Name of GYN Physician: [facility]    POB:  G1  Pgyn: Hx of fibroids, HSV2 (most recent outbreak last week), denies cysts, STI's, Abnormal pap smears   PMH: T2DM  PSH: Nine    Home meds: Lispro 10/10/10, NPH 22u, Valtrex, PNV        Social History:  Denies smoking use, drug use, alcohol use.     Vital Signs Last 24 Hrs  T(C): 36.6 (2025 16:31), Max: 36.6 (2025 16:25)  T(F): 97.88 (2025 16:31), Max: 97.9 (2025 16:25)  HR: 76 (2025 16:38) (76 - 79)  BP: 125/68 (2025 16:38) (110/69 - 125/68)  BP(mean): --  RR: 16 (2025 16:31) (16 - 16)  SpO2: 98% (2025 16:25) (98% - 98%)    Parameters below as of 2025 16:25  Patient On (Oxygen Delivery Method): room air      FHR: HR 130s, moderate variability, accelerations present, no decelerations, Category 1.  [place]: Contractions present, irregular  TAUS: Vertex     HPI:  41yo  @ 37w0d presented to L&D triage from office for variables on NST. Pt also with uncontrolled type II diabetes, on Lispro 10/10/10 and NPH 22u at bedtime. Pt reports her insulin has been gradually uptitrated in the last few weeks.   Pt reports her glucose were controlled prior to pregnancy. She denies any LOF/VB. She endorses+ FM and intermittent contractions. She denies any SOB/CP/N/V/bowel or urinary changes    PNC: c/b T2DM    Fetal echocardiogram: 3/28/25: Technically difficult examination due to suboptimal echocardiographic windows.    Patent foramen ovale and patent ductus arteriosus with right to left flow.  No further fetal or  pediatric cardiology follow up is indicated unless there is a clinical concern.    Recent A1C:  6.2% on 2025 at 9 1/7 wks.    Finger stick review per diabetes educator on ()  FB-94mg/dL, 1x elevated.  PPBG Breakfast: 153, 96, 123mg/dL, 1x elevated. Took 8-10units lispro 3x.  PPBG Lunch: 158, 173, 180mg/dL, all 3x elevated when she tested. She did not test the one time she took 10units.  PPBG Dinner: 199, 192, 183mg/dL, all 3xelevated. Pt only took 8units 1x with the 192mg/dl.    Per Dr. Kim, last GS : EFW 6#4, 71%, AC 80%  ATU (7/3): Cephalic, ant, DALJIT 21.34, BPP 8/8    Name of GYN Physician: [facility]    POB:  G1  Pgyn: Hx of fibroids, HSV2 (most recent outbreak last week), denies cysts, STI's, Abnormal pap smears   PMH: T2DM  PSH: Nine  Home meds: Lispro 10/10/10, NPH 22u, Valtrex, PNV  Social History:  Denies smoking use, drug use, alcohol use.     Vital Signs Last 24 Hrs  T(C): 36.6 (2025 16:31), Max: 36.6 (2025 16:25)  T(F): 97.88 (2025 16:31), Max: 97.9 (2025 16:25)  HR: 76 (2025 16:38) (76 - 79)  BP: 125/68 (2025 16:38) (110/69 - 125/68)  BP(mean): --  RR: 16 (2025 16:31) (16 - 16)  SpO2: 98% (2025 16:25) (98% - 98%)    NST: Baseline 130s, moderate variability, accelerations present, no decelerations, Category 1 reactive  [place]: No contractions      HPI:  41yo  @ 36w6d presented to L&D triage from office for variables on NST. Pt also with uncontrolled type II diabetes, on Lispro 10/10/10 and NPH 22u at bedtime. Pt reports her insulin has been gradually uptitrated in the last few weeks.   Pt reports her glucose were controlled prior to pregnancy. She denies any LOF/VB. She endorses+ FM and intermittent contractions. She denies any SOB/CP/N/V/bowel or urinary changes    PNC: c/b T2DM    Fetal echocardiogram: 3/28/25: Technically difficult examination due to suboptimal echocardiographic windows.    Patent foramen ovale and patent ductus arteriosus with right to left flow.  No further fetal or  pediatric cardiology follow up is indicated unless there is a clinical concern.    Recent A1C:  6.2% on 2025 at 9 1/7 wks.    Finger stick review per diabetes educator on ()  FB-94mg/dL, 1x elevated.  PPBG Breakfast: 153, 96, 123mg/dL, 1x elevated. Took 8-10units lispro 3x.  PPBG Lunch: 158, 173, 180mg/dL, all 3x elevated when she tested. She did not test the one time she took 10units.  PPBG Dinner: 199, 192, 183mg/dL, all 3xelevated. Pt only took 8units 1x with the 192mg/dl.    Per Dr. Kim, last GS : EFW 6#4, 71%, AC 80%  ATU (7/3): Cephalic, ant, DALJIT 21.34, BPP 8/8    Name of GYN Physician: [facility]    POB:  G1  Pgyn: Hx of fibroids, HSV2 (most recent outbreak last week), denies cysts, STI's, Abnormal pap smears   PMH: T2DM  PSH: Nine  Home meds: Lispro 10/10/10, NPH 22u, Valtrex, PNV  Social History:  Denies smoking use, drug use, alcohol use.     Vital Signs Last 24 Hrs  T(C): 36.6 (2025 16:31), Max: 36.6 (2025 16:25)  T(F): 97.88 (2025 16:31), Max: 97.9 (2025 16:25)  HR: 76 (2025 16:38) (76 - 79)  BP: 125/68 (2025 16:38) (110/69 - 125/68)  BP(mean): --  RR: 16 (2025 16:31) (16 - 16)  SpO2: 98% (2025 16:25) (98% - 98%)    NST: Baseline 130s, moderate variability, accelerations present, no decelerations, Category 1 reactive  [place]: No contractions

## 2025-07-14 NOTE — CHART NOTE - NSCHARTNOTEFT_GEN_A_CORE
MFM Fellow Note    I received a telephone call regarding this patient for MFM consult. History and evaluation per inpt team and chart review.     39yo  at 36w6d with T2DM on insulin, sent from her primary OB’s office due to variables noted on NST. The patient is otherwise asymptomatic per triage team. The patient has been followed by diabetes educators to manage her T2DM and is currently on an insulin regimen of NPH 22u qhs, Lispro 8-10u with meals. She was last seen by diabetes educator earlier today who was able to review her FS over the past 4-5 days with limited finger stick paneling available for review (patient was without supplies due to insurance issues for 2weeks). It was noted there were postprandial elevations ranging from 150-190s, however the patient was inconsistently taking her mealtime insulin and was instructed to take her Lispro with meals (no uptitration was recommended).  Additionally patient has history of anogenital herpes with most recent outbreak last week. She is currently taking valtrex. She is planned for primary cesaran section. No other complications in the pregnancy.    Upon arrival to L&D triage, the fetal tracing showed moderate variability, reactivity, no significant decelerations noted. Bedside sonogram performed by triage team showed BPP 8/8, DALJIT 25.8cm, vtx. Last growth sono on  EFW 2277g (82%), AC 87%. SVE was 0/0/-3. Random FS was 70. VS wnl.    Recommendations:  MFM was consulted regarding timing of delivery. Given that the patient’s tracing on L&D has been reassuring with no significant decelerations after prolonged monitoring, no indication for urgent delivery at this time. In regards to her glycemic control, on review of her consults with DM educators, there is limited data for finger stick paneling (patient did not have supplies for 2w) and her postprandial elevations may be explained by missing mealtime insulin doses. Since control is unclear at this time, fetus has been appropriately grown, normal random FS, and reassuring fetal testing, it is not clear that her diabetes control warrants delivery prior to 38w.     We would recommend monitoring her fingersticks overnight and tomorrow with insulin regimen and carb consistent diet. Tracing over the past 3 hours have been reassuring, can transition to NST BID. Can plan for repeat growth scan in the AM. Timing of delivery would depend on glycemic control. Continue valtrex for suppression.    Discussed with ANEL Bradford attending  ANEL Villafuerte Fellow MFM Fellow Note    I received a telephone call regarding this patient for MFM consult. History and evaluation per inpt team and chart review.     39yo  at 36w6d with T2DM on insulin, sent from her primary OB’s office due to variables noted on NST. The patient is otherwise asymptomatic per triage team. The patient has been followed by diabetes educators to manage her T2DM and is currently on an insulin regimen of NPH 22u qhs, Lispro 8-10u with meals. She was last seen by diabetes educator earlier today who was able to review her FS over the past 4-5 days with limited finger stick paneling available for review (patient was without supplies due to insurance issues for 2weeks). It was noted there were postprandial elevations ranging from 150-190s, however the patient was inconsistently taking her mealtime insulin and was instructed to take her Lispro with meals (no uptitration was recommended).  Additionally patient has history of anogenital herpes with most recent outbreak last week. She is currently taking valtrex. She is planned for primary cesaran section. No other complications in the pregnancy.    Upon arrival to L&D triage, the fetal tracing showed moderate variability, reactivity, no significant decelerations noted. Bedside sonogram performed by triage team showed BPP 8/8, DALJIT 25.8cm, vtx. Last growth sono on  EFW 2277g (82%), AC 87%. SVE was 0/0/-3. Random FS was 70. VS wnl.    Recommendations:  MFM was consulted regarding timing of delivery. Given that the patient’s tracing on L&D has been reassuring with no significant decelerations after prolonged monitoring, no indication for urgent delivery at this time. In regards to her glycemic control, on review of her consults with DM educators, there is limited data for finger stick paneling (patient did not have supplies for 2w) and her postprandial elevations may be explained by missing mealtime insulin doses. Since control is unclear at this time, fetus has been appropriately grown, normal random FS, and reassuring fetal testing, it is not clear that her diabetes control warrants delivery prior to 38w.     We would recommend monitoring her fingersticks overnight and tomorrow with insulin regimen and carb consistent diet. Tracing over the past 3 hours have been reassuring, can transition to NST BID. Can plan for repeat growth scan in the AM. Timing of delivery would depend on glycemic control. Continue valtrex for suppression.    Discussed with ANEL Bradford attending  ANEL Villafuerte Fellow      ----------------------  ANEL Attending Note  Patient discussed with fellow via phone after consultation requested if delivery indicated at this time.  This is primigravid with DM2 on insulin for both meal and long acting coverage- random glucose on presentation <100. Her last growth 1 month ago was normal and insulin titration last today showed inconsistent glucose monitoring.  EFM reactive during monitoring in triage. Patient with recent diagnosis of HSV on valtrex.     At this time, there is not a clear indication for delivery; however, the patient is reported to have less monitoring of glucose from diabetes notes. Could have overnight evaluation to see if glycemic control is suboptimal- utilize current insulin regimen 8-10 U short acting insulin with meals and 22 U NPH QHS.  Could have ultrasound tomorrow for fetal growth or outpatient if glycemic control appears normal. Would be term tomorrow and if determined to have suboptimal glycemic control may be indication for early term delivery.     ANEL Bolton Attending

## 2025-07-14 NOTE — OB PROVIDER H&P - NSLOWPPHRISK_OBGYN_A_OB
No previous uterine incision/[place] Pregnancy/Less than or equal to 4 previous vaginal births/No known bleeding disorder/No history of postpartum hemorrhage

## 2025-07-14 NOTE — OB PROVIDER TRIAGE NOTE - NSHPPHYSICALEXAM_GEN_ALL_CORE
General: Female sitting comfortably   Neuro: No facial asymmetry, no slurred speech, moves all 4 extremities  Mood: Alert and lucid, appropriate mood and affect  Head: Normocephalic. Atraumatic.   Eyes: No discharge, lids normal, conjunctiva normal  Lungs: No respiratory distress  Abdomen: Soft, nontender. Gravid. No contractions on palpation  Extremities: No LE edema bilaterally    Vital Signs Last 24 Hrs  T(C): 36.6 (14 Jul 2025 16:31), Max: 36.6 (14 Jul 2025 16:25)  T(F): 97.88 (14 Jul 2025 16:31), Max: 97.9 (14 Jul 2025 16:25)  HR: 76 (14 Jul 2025 16:38) (76 - 79)  BP: 125/68 (14 Jul 2025 16:38) (110/69 - 125/68)  BP(mean): --  RR: 16 (14 Jul 2025 16:31) (16 - 16)  SpO2: 98% (14 Jul 2025 16:25) (98% - 98%)    Parameters below as of 14 Jul 2025 16:25  Patient On (Oxygen Delivery Method): room air    NST  Baseline  ( 130  ) BPM  Variability ( X  )  Moderate   (  ) Minimal  (  ) Absent  (  )  Marked  Accelerations ( X ) 15x15   (  ) 10x10  (  ) no  Decelerations ( X ) no  (  ) Variable  (  ) Early  (  ) Late      Description _________  Contractions (  ) no  ( X) yes     Description  __________  Interpretation (X  ) reactive   (  )  non-reactive  saved in ASOB- TAUS- Cephalic presentation, anterior placenta, DALJIT 25.87cm, BPP 8/8, M-mode 123bpm,    SSE : Pt denies Speculum exam, small pimple noted the right side of the labia, Hemorrhoid noted on the buttock area.   SVE: 0/0/-3 exam chaperoned by: MANJINDER Allred

## 2025-07-14 NOTE — OB PROVIDER H&P - NSHPPHYSICALEXAM_GEN_ALL_CORE
General: Female sitting comfortably   Neuro: No facial asymmetry, no slurred speech, moves all 4 extremities  Mood: Alert and lucid, appropriate mood and affect  Head: Normocephalic. Atraumatic.   Eyes: No discharge, lids normal, conjunctiva normal  Lungs: No respiratory distress  Abdomen: Soft, nontender. Gravid. No contractions on palpation  Extremities: No LE edema bilaterally    Vital Signs Last 24 Hrs  T(C): 36.6 (14 Jul 2025 16:31), Max: 36.6 (14 Jul 2025 16:25)  T(F): 97.88 (14 Jul 2025 16:31), Max: 97.9 (14 Jul 2025 16:25)  HR: 76 (14 Jul 2025 16:38) (76 - 79)  BP: 125/68 (14 Jul 2025 16:38) (110/69 - 125/68)  BP(mean): --  RR: 16 (14 Jul 2025 16:31) (16 - 16)  SpO2: 98% (14 Jul 2025 16:25) (98% - 98%)    Parameters below as of 14 Jul 2025 16:25  Patient On (Oxygen Delivery Method): room air    NST  Baseline  ( 130  ) BPM  Variability ( X  )  Moderate   (  ) Minimal  (  ) Absent  (  )  Marked  Accelerations ( X ) 15x15   (  ) 10x10  (  ) no  Decelerations ( X ) no  (  ) Variable  (  ) Early  (  ) Late      Description _________  Contractions (  ) no  ( X) yes     Description  __________  Interpretation (X  ) reactive   (  )  non-reactive  saved in ASOB- TAUS- Cephalic presentation, anterior placenta, DALJIT 25.87cm, BPP 8/8, M-mode 123bpm,    SSE : Pt declines Speculum exam, small pimple noted the right side of the labia, Hemorrhoid noted on the buttock area.   SVE: 0/0/-3 exam chaperoned by: MANJINDER Allred

## 2025-07-14 NOTE — OB RN PATIENT PROFILE - BP NONINVASIVE DIASTOLIC (MM HG)
Pt was received on BUBBLE CPAP at +5.  Pt tolerating well, will continue to monitor patient and wean as tolerated.    75

## 2025-07-15 ENCOUNTER — APPOINTMENT (OUTPATIENT)
Dept: ANTEPARTUM | Facility: CLINIC | Age: 40
End: 2025-07-15
Payer: COMMERCIAL

## 2025-07-15 ENCOUNTER — ASOB RESULT (OUTPATIENT)
Age: 40
End: 2025-07-15

## 2025-07-15 ENCOUNTER — TRANSCRIPTION ENCOUNTER (OUTPATIENT)
Age: 40
End: 2025-07-15

## 2025-07-15 LAB
A1C WITH ESTIMATED AVERAGE GLUCOSE RESULT: 7.3 % — HIGH (ref 4–5.6)
ALBUMIN SERPL ELPH-MCNC: 2.8 G/DL — LOW (ref 3.3–5)
ALP SERPL-CCNC: 159 U/L — HIGH (ref 40–120)
ALT FLD-CCNC: 9 U/L — SIGNIFICANT CHANGE UP (ref 4–33)
ANION GAP SERPL CALC-SCNC: 12 MMOL/L — SIGNIFICANT CHANGE UP (ref 7–14)
APPEARANCE UR: CLEAR — SIGNIFICANT CHANGE UP
AST SERPL-CCNC: 19 U/L — SIGNIFICANT CHANGE UP (ref 4–32)
BACTERIA # UR AUTO: NEGATIVE /HPF — SIGNIFICANT CHANGE UP
BASOPHILS # BLD AUTO: 0.01 K/UL — SIGNIFICANT CHANGE UP (ref 0–0.2)
BASOPHILS NFR BLD AUTO: 0.1 % — SIGNIFICANT CHANGE UP (ref 0–2)
BILIRUB SERPL-MCNC: <0.2 MG/DL — SIGNIFICANT CHANGE UP (ref 0.2–1.2)
BILIRUB UR-MCNC: NEGATIVE — SIGNIFICANT CHANGE UP
BUN SERPL-MCNC: 8 MG/DL — SIGNIFICANT CHANGE UP (ref 7–23)
CALCIUM SERPL-MCNC: 8.8 MG/DL — SIGNIFICANT CHANGE UP (ref 8.4–10.5)
CAST: 8 /LPF — HIGH (ref 0–4)
CHLORIDE SERPL-SCNC: 107 MMOL/L — SIGNIFICANT CHANGE UP (ref 98–107)
CO2 SERPL-SCNC: 22 MMOL/L — SIGNIFICANT CHANGE UP (ref 22–31)
COLOR SPEC: YELLOW — SIGNIFICANT CHANGE UP
CREAT ?TM UR-MCNC: 90 MG/DL — SIGNIFICANT CHANGE UP
CREAT SERPL-MCNC: 0.73 MG/DL — SIGNIFICANT CHANGE UP (ref 0.5–1.3)
DIFF PNL FLD: ABNORMAL
EGFR: 105 ML/MIN/1.73M2 — SIGNIFICANT CHANGE UP
EGFR: 105 ML/MIN/1.73M2 — SIGNIFICANT CHANGE UP
EOSINOPHIL # BLD AUTO: 0.01 K/UL — SIGNIFICANT CHANGE UP (ref 0–0.5)
EOSINOPHIL NFR BLD AUTO: 0.1 % — SIGNIFICANT CHANGE UP (ref 0–6)
ESTIMATED AVERAGE GLUCOSE: 163 — SIGNIFICANT CHANGE UP
GLUCOSE BLDC GLUCOMTR-MCNC: 108 MG/DL — HIGH (ref 70–99)
GLUCOSE BLDC GLUCOMTR-MCNC: 56 MG/DL — LOW (ref 70–99)
GLUCOSE BLDC GLUCOMTR-MCNC: 60 MG/DL — LOW (ref 70–99)
GLUCOSE BLDC GLUCOMTR-MCNC: 63 MG/DL — LOW (ref 70–99)
GLUCOSE BLDC GLUCOMTR-MCNC: 64 MG/DL — LOW (ref 70–99)
GLUCOSE BLDC GLUCOMTR-MCNC: 64 MG/DL — LOW (ref 70–99)
GLUCOSE BLDC GLUCOMTR-MCNC: 67 MG/DL — LOW (ref 70–99)
GLUCOSE BLDC GLUCOMTR-MCNC: 90 MG/DL — SIGNIFICANT CHANGE UP (ref 70–99)
GLUCOSE SERPL-MCNC: 79 MG/DL — SIGNIFICANT CHANGE UP (ref 70–99)
GLUCOSE UR QL: NEGATIVE MG/DL — SIGNIFICANT CHANGE UP
HCT VFR BLD CALC: 36.6 % — SIGNIFICANT CHANGE UP (ref 34.5–45)
HGB BLD-MCNC: 12.3 G/DL — SIGNIFICANT CHANGE UP (ref 11.5–15.5)
IMM GRANULOCYTES # BLD AUTO: 0.03 K/UL — SIGNIFICANT CHANGE UP (ref 0–0.07)
IMM GRANULOCYTES NFR BLD AUTO: 0.3 % — SIGNIFICANT CHANGE UP (ref 0–0.9)
KETONES UR QL: ABNORMAL MG/DL
LDH SERPL L TO P-CCNC: 231 U/L — HIGH (ref 135–225)
LEUKOCYTE ESTERASE UR-ACNC: NEGATIVE — SIGNIFICANT CHANGE UP
LYMPHOCYTES # BLD AUTO: 1.63 K/UL — SIGNIFICANT CHANGE UP (ref 1–3.3)
LYMPHOCYTES NFR BLD AUTO: 16.6 % — SIGNIFICANT CHANGE UP (ref 13–44)
MCHC RBC-ENTMCNC: 32.2 PG — SIGNIFICANT CHANGE UP (ref 27–34)
MCHC RBC-ENTMCNC: 33.6 G/DL — SIGNIFICANT CHANGE UP (ref 32–36)
MCV RBC AUTO: 95.8 FL — SIGNIFICANT CHANGE UP (ref 80–100)
MONOCYTES # BLD AUTO: 0.63 K/UL — SIGNIFICANT CHANGE UP (ref 0–0.9)
MONOCYTES NFR BLD AUTO: 6.4 % — SIGNIFICANT CHANGE UP (ref 2–14)
NEUTROPHILS # BLD AUTO: 7.49 K/UL — HIGH (ref 1.8–7.4)
NEUTROPHILS NFR BLD AUTO: 76.5 % — SIGNIFICANT CHANGE UP (ref 43–77)
NITRITE UR-MCNC: NEGATIVE — SIGNIFICANT CHANGE UP
NRBC # BLD AUTO: 0 K/UL — SIGNIFICANT CHANGE UP (ref 0–0)
NRBC # FLD: 0 K/UL — SIGNIFICANT CHANGE UP (ref 0–0)
NRBC BLD AUTO-RTO: 0 /100 WBCS — SIGNIFICANT CHANGE UP (ref 0–0)
PH UR: 6 — SIGNIFICANT CHANGE UP (ref 5–8)
PLATELET # BLD AUTO: 232 K/UL — SIGNIFICANT CHANGE UP (ref 150–400)
PMV BLD: 9.8 FL — SIGNIFICANT CHANGE UP (ref 7–13)
POTASSIUM SERPL-MCNC: 4.7 MMOL/L — SIGNIFICANT CHANGE UP (ref 3.5–5.3)
POTASSIUM SERPL-SCNC: 4.7 MMOL/L — SIGNIFICANT CHANGE UP (ref 3.5–5.3)
PROT ?TM UR-MCNC: 13 MG/DL — SIGNIFICANT CHANGE UP
PROT SERPL-MCNC: 5.9 G/DL — LOW (ref 6–8.3)
PROT UR-MCNC: SIGNIFICANT CHANGE UP MG/DL
PROT/CREAT UR-RTO: 0.2 RATIO — SIGNIFICANT CHANGE UP (ref 0–0.2)
RBC # BLD: 3.82 M/UL — SIGNIFICANT CHANGE UP (ref 3.8–5.2)
RBC # FLD: 13.4 % — SIGNIFICANT CHANGE UP (ref 10.3–14.5)
RBC CASTS # UR COMP ASSIST: 20 /HPF — HIGH (ref 0–4)
REVIEW: SIGNIFICANT CHANGE UP
SODIUM SERPL-SCNC: 141 MMOL/L — SIGNIFICANT CHANGE UP (ref 135–145)
SP GR SPEC: 1.02 — SIGNIFICANT CHANGE UP (ref 1–1.03)
SQUAMOUS # UR AUTO: 1 /HPF — SIGNIFICANT CHANGE UP (ref 0–5)
URATE SERPL-MCNC: 5.4 MG/DL — SIGNIFICANT CHANGE UP (ref 2.5–7)
UROBILINOGEN FLD QL: 0.2 MG/DL — SIGNIFICANT CHANGE UP (ref 0.2–1)
WBC # BLD: 9.8 K/UL — SIGNIFICANT CHANGE UP (ref 3.8–10.5)
WBC # FLD AUTO: 9.8 K/UL — SIGNIFICANT CHANGE UP (ref 3.8–10.5)
WBC UR QL: 1 /HPF — SIGNIFICANT CHANGE UP (ref 0–5)

## 2025-07-15 PROCEDURE — 76819 FETAL BIOPHYS PROFIL W/O NST: CPT | Mod: 26,59

## 2025-07-15 PROCEDURE — 99233 SBSQ HOSP IP/OBS HIGH 50: CPT | Mod: GC,25

## 2025-07-15 PROCEDURE — 76816 OB US FOLLOW-UP PER FETUS: CPT | Mod: 26

## 2025-07-15 RX ORDER — GLUCAGON 3 MG/1
1 POWDER NASAL ONCE
Refills: 0 | Status: DISCONTINUED | OUTPATIENT
Start: 2025-07-15 | End: 2025-07-18

## 2025-07-15 RX ORDER — LABETALOL HYDROCHLORIDE 200 MG/1
20 TABLET, FILM COATED ORAL ONCE
Refills: 0 | Status: COMPLETED | OUTPATIENT
Start: 2025-07-15 | End: 2025-07-15

## 2025-07-15 RX ORDER — SODIUM CHLORIDE 9 G/1000ML
1000 INJECTION, SOLUTION INTRAVENOUS
Refills: 0 | Status: DISCONTINUED | OUTPATIENT
Start: 2025-07-15 | End: 2025-07-17

## 2025-07-15 RX ORDER — OXYCODONE HYDROCHLORIDE 30 MG/1
5 TABLET ORAL ONCE
Refills: 0 | Status: DISCONTINUED | OUTPATIENT
Start: 2025-07-15 | End: 2025-07-18

## 2025-07-15 RX ORDER — DEXTROSE 50 % IN WATER 50 %
25 SYRINGE (ML) INTRAVENOUS ONCE
Refills: 0 | Status: DISCONTINUED | OUTPATIENT
Start: 2025-07-15 | End: 2025-07-18

## 2025-07-15 RX ORDER — MAGNESIUM SULFATE 500 MG/ML
2 SYRINGE (ML) INJECTION
Qty: 40 | Refills: 0 | Status: DISCONTINUED | OUTPATIENT
Start: 2025-07-15 | End: 2025-07-16

## 2025-07-15 RX ORDER — MAGNESIUM HYDROXIDE 400 MG/5ML
30 SUSPENSION ORAL
Refills: 0 | Status: DISCONTINUED | OUTPATIENT
Start: 2025-07-15 | End: 2025-07-18

## 2025-07-15 RX ORDER — SODIUM CHLORIDE 9 G/1000ML
500 INJECTION, SOLUTION INTRAVENOUS ONCE
Refills: 0 | Status: COMPLETED | OUTPATIENT
Start: 2025-07-15 | End: 2025-07-15

## 2025-07-15 RX ORDER — SODIUM CHLORIDE 9 G/1000ML
1000 INJECTION, SOLUTION INTRAVENOUS
Refills: 0 | Status: DISCONTINUED | OUTPATIENT
Start: 2025-07-15 | End: 2025-07-15

## 2025-07-15 RX ORDER — DEXTROSE 50 % IN WATER 50 %
15 SYRINGE (ML) INTRAVENOUS ONCE
Refills: 0 | Status: DISCONTINUED | OUTPATIENT
Start: 2025-07-15 | End: 2025-07-18

## 2025-07-15 RX ORDER — HEPARIN SODIUM 1000 [USP'U]/ML
10000 INJECTION INTRAVENOUS; SUBCUTANEOUS EVERY 12 HOURS
Refills: 0 | Status: DISCONTINUED | OUTPATIENT
Start: 2025-07-15 | End: 2025-07-18

## 2025-07-15 RX ORDER — SODIUM CHLORIDE 9 G/1000ML
1000 INJECTION, SOLUTION INTRAVENOUS
Refills: 0 | Status: DISCONTINUED | OUTPATIENT
Start: 2025-07-15 | End: 2025-07-16

## 2025-07-15 RX ORDER — SIMETHICONE 80 MG
80 TABLET,CHEWABLE ORAL EVERY 4 HOURS
Refills: 0 | Status: DISCONTINUED | OUTPATIENT
Start: 2025-07-15 | End: 2025-07-18

## 2025-07-15 RX ORDER — DIPHENHYDRAMINE HCL 12.5MG/5ML
25 ELIXIR ORAL EVERY 6 HOURS
Refills: 0 | Status: DISCONTINUED | OUTPATIENT
Start: 2025-07-15 | End: 2025-07-18

## 2025-07-15 RX ORDER — INSULIN LISPRO 100 U/ML
INJECTION, SOLUTION INTRAVENOUS; SUBCUTANEOUS AT BEDTIME
Refills: 0 | Status: DISCONTINUED | OUTPATIENT
Start: 2025-07-15 | End: 2025-07-18

## 2025-07-15 RX ORDER — NIFEDIPINE 30 MG
30 TABLET, EXTENDED RELEASE 24 HR ORAL DAILY
Refills: 0 | Status: DISCONTINUED | OUTPATIENT
Start: 2025-07-15 | End: 2025-07-18

## 2025-07-15 RX ORDER — DEXTROSE 50 % IN WATER 50 %
12.5 SYRINGE (ML) INTRAVENOUS ONCE
Refills: 0 | Status: DISCONTINUED | OUTPATIENT
Start: 2025-07-15 | End: 2025-07-18

## 2025-07-15 RX ORDER — CITRIC ACID/SODIUM CITRATE 300-500 MG
30 SOLUTION, ORAL ORAL ONCE
Refills: 0 | Status: COMPLETED | OUTPATIENT
Start: 2025-07-15 | End: 2025-07-15

## 2025-07-15 RX ORDER — OXYTOCIN-SODIUM CHLORIDE 0.9% IV SOLN 30 UNIT/500ML 30-0.9/5 UT/ML-%
42 SOLUTION INTRAVENOUS
Qty: 30 | Refills: 0 | Status: DISCONTINUED | OUTPATIENT
Start: 2025-07-15 | End: 2025-07-16

## 2025-07-15 RX ORDER — MAGNESIUM SULFATE 500 MG/ML
4 SYRINGE (ML) INJECTION ONCE
Refills: 0 | Status: COMPLETED | OUTPATIENT
Start: 2025-07-15 | End: 2025-07-15

## 2025-07-15 RX ORDER — SODIUM CHLORIDE 9 G/1000ML
1000 INJECTION, SOLUTION INTRAVENOUS
Refills: 0 | Status: DISCONTINUED | OUTPATIENT
Start: 2025-07-15 | End: 2025-07-18

## 2025-07-15 RX ORDER — MODIFIED LANOLIN 100 %
1 CREAM (GRAM) TOPICAL EVERY 6 HOURS
Refills: 0 | Status: DISCONTINUED | OUTPATIENT
Start: 2025-07-15 | End: 2025-07-18

## 2025-07-15 RX ORDER — IBUPROFEN 200 MG
600 TABLET ORAL EVERY 6 HOURS
Refills: 0 | Status: COMPLETED | OUTPATIENT
Start: 2025-07-15 | End: 2026-06-13

## 2025-07-15 RX ORDER — ACETAMINOPHEN 500 MG/5ML
975 LIQUID (ML) ORAL
Refills: 0 | Status: DISCONTINUED | OUTPATIENT
Start: 2025-07-15 | End: 2025-07-18

## 2025-07-15 RX ORDER — INSULIN LISPRO 100 U/ML
INJECTION, SOLUTION INTRAVENOUS; SUBCUTANEOUS
Refills: 0 | Status: DISCONTINUED | OUTPATIENT
Start: 2025-07-15 | End: 2025-07-18

## 2025-07-15 RX ORDER — OXYCODONE HYDROCHLORIDE 30 MG/1
5 TABLET ORAL
Refills: 0 | Status: COMPLETED | OUTPATIENT
Start: 2025-07-15 | End: 2025-07-22

## 2025-07-15 RX ADMIN — Medication 1 TABLET(S): at 10:52

## 2025-07-15 RX ADMIN — Medication 162 MILLIGRAM(S): at 10:52

## 2025-07-15 RX ADMIN — OXYTOCIN-SODIUM CHLORIDE 0.9% IV SOLN 30 UNIT/500ML 42 MILLIUNIT(S)/MIN: 30-0.9/5 SOLUTION at 20:53

## 2025-07-15 RX ADMIN — Medication 30 MILLILITER(S): at 16:29

## 2025-07-15 RX ADMIN — Medication 30 MILLIGRAM(S): at 20:09

## 2025-07-15 RX ADMIN — Medication 500 MILLIGRAM(S): at 10:52

## 2025-07-15 RX ADMIN — Medication 20 MILLIGRAM(S): at 16:28

## 2025-07-15 RX ADMIN — SODIUM CHLORIDE 500 MILLILITER(S): 9 INJECTION, SOLUTION INTRAVENOUS at 02:09

## 2025-07-15 RX ADMIN — Medication 1 APPLICATION(S): at 16:29

## 2025-07-15 RX ADMIN — CYANOCOBALAMIN 1000 MICROGRAM(S): 1000 INJECTION INTRAMUSCULAR; SUBCUTANEOUS at 10:53

## 2025-07-15 RX ADMIN — SODIUM CHLORIDE 1000 MILLILITER(S): 9 INJECTION, SOLUTION INTRAVENOUS at 22:00

## 2025-07-15 RX ADMIN — Medication 975 MILLIGRAM(S): at 00:02

## 2025-07-15 RX ADMIN — LABETALOL HYDROCHLORIDE 20 MILLIGRAM(S): 200 TABLET, FILM COATED ORAL at 20:11

## 2025-07-15 RX ADMIN — Medication 300 GRAM(S): at 20:24

## 2025-07-15 RX ADMIN — SODIUM CHLORIDE 125 MILLILITER(S): 9 INJECTION, SOLUTION INTRAVENOUS at 13:47

## 2025-07-15 RX ADMIN — Medication 50 GM/HR: at 20:39

## 2025-07-15 RX ADMIN — Medication 2000 UNIT(S): at 10:53

## 2025-07-15 NOTE — OB RN DELIVERY SUMMARY - NS_SEPSISRSKCALC_OBGYN_ALL_OB_FT
EOS calculated successfully. EOS Risk Factor: 0.5/1000 live births ([facility] national incidence); GA=37w;Temp=98.8; ROM=0.05; GBS='Negative'; Antibiotics='No antibiotics or any antibiotics < 2 hrs prior to birth'

## 2025-07-15 NOTE — OB RN DELIVERY SUMMARY - NSSELHIDDEN_OBGYN_ALL_OB_FT
Reason For Visit  Michael Santana is an established patient here today for a follow-up management of headaches and fibromyalgia . :  services not used. She is unaccompanied. Referred By: PCP: Dr. Vargas Nose    Smoking Cessation CI tobacco use and did not provide intervention and counseling in regards to tobacco use. History of Present Illness  Patient is a 59year old female with a history of chronic migraines and chronic pain. She is taking Topamax 100 mg QD HS for migraine prevention which she said was helpful. On a previous office visit she was tried on Midrin which did not help her headaches but she does note that naratriptan 2.5 mg is more effective. She states that she is averaging around 8 headaches per month where she feels nausea but does not vomit. Her headaches recently are reportedly different from previous headaches in that they start in the temporal region and radiate around. In today's visit, she did inquire about the possibility of trying Botox for migraine. In the past she has failed valproic acid and beta blockers for migraine prevention. Her chronic pain is also managed by Dr. Abhijeet Santiago who has her on chronic daily opioids with Dilaudid 2 mg TID - QID PRN. Patient is taking oxcarbazepine 450 mg BID, trazodone 150 mg QD HS, and Xanax 2 mg TID PRN for bipolar disorder followed by Dr. Jerardo Rider. Patient's other issue is obstructive sleep apnea treated with CPAP. She states that she has been compliant. She has had 16 pound weight loss with the renal diet due which she started after a recent visit with Dr. Jimmy Armas who noticed previously elevated labs which are now stable. Patient may need dialysis in the future. Still has poor mobility related to her chronic pain condition. She has no prior history of stroke or TIA and denies any kind of weakness numbness or slurred speech with these episodes.  Patient also has fibromyalgia and chronic pressure and aggravating her pain management. Review of Systems    Const: Normal.   Eyes: Normal.   CV: Normal.   Resp: Normal.   GI: Normal.   : Normal.   Musc: Per HPI. Neuro: Per HPI. Psych: Per HPI. Skin: Normal.      Allergies   1. Imitrex   2. Norflex   3. Morphine Derivatives   4. Tetracycline HCl TABS    Current Meds   1. Advair Diskus 250-50 MCG/DOSE Inhalation Aerosol Powder Breath Activated; INHALE 1   PUFF TWICE DAILY; Therapy: (Recorded:[id number]) to Recorded   2. ALPRAZolam 2 MG Oral Tablet; 1 TAB TID; Therapy: (Recorded:[id number]) to Recorded   3. Dilaudid 2 MG Oral Tablet; TAKE 1 TABLET EVERY 3 TO 4 HOURS AS NEEDED FOR   PAIN;   Therapy: (Tiki Garces) to Recorded   4. Fluconazole 150 MG Oral Tablet; Take 1 tab daily for 2 weeks; Therapy: [id number] to (Last [facility])  Requested for: [id number] Ordered   5. Furosemide 40 MG Oral Tablet; Therapy: (Holley Celis) to Recorded   6. Lactulose 10 GM/15ML SYRP;   Therapy: (Holley Celis) to Recorded   7. Lisinopril 5 MG Oral Tablet; TAKE 1 TABLET DAILY AS DIRECTED; Therapy: (Tiki Garces) to Recorded   8. Meclizine HCl - 25 MG Oral Tablet; Therapy: (Recorded:[id number]) to Recorded   9. Metoprolol Succinate ER 25 MG Oral Tablet Extended Release 24 Hour; Therapy: (Holley Celis) to Recorded   10. Naratriptan HCl - 2.5 MG Oral Tablet; TAKE 1 TABLET BY MOUTH AT ONSET OF    MIGRAINE. MAY REPEAT 1 TIME IN 2 HOURS, NO MORE THAN 2 TABLETS PER DAY    AND 2 DAYS EVERY WEEK PT NEEDS GARTH; Therapy: [id number] to (Evaluate:00Nev8815)  Requested for: 05Ntw2146; Last    Rx:[id number] Ordered   11. Nystatin Powder; Therapy: (Holley Celis) to Recorded   12. Pantoprazole Sodium 40 MG Oral Tablet Delayed Release; Therapy: (Holley Celis) to Recorded   13. Proventil AERS; Therapy: (Holley Celis) to Recorded   14. Simvastatin 40 MG Oral Tablet; Take one tablet by mouth every night at bedtime;     Therapy: 41Rny0359 to (Raisa Lee)  Requested for: 12Apr2012; Last    Rx:[id number] Ordered   15. Singulair 10 MG Oral Tablet; TAKE 1 TABLET AT BEDTIME; Therapy: (Recorded:60Tll8522) to Recorded   16. Spiriva HandiHaler 18 MCG Inhalation Capsule; Therapy: (Recorded:15Zjt1673) to Recorded   17. TiZANidine HCl - 4 MG Oral Tablet; TAKE 1 TABLET 3 TIMES DAILY AS NEEDED; Therapy: ([id number]) to Recorded   18. Topiramate 100 MG Oral Tablet; TAKE 1 TABLET AT BEDTIME; Therapy: 22NBT4698 to (Evaluate:28Afp1322)  Requested for: 62Sse1101; Last    Rx:[id number] Ordered   19. TraMADol HCl - 50 MG Oral Tablet; TAKE 2 TABLETS BY MOUTH THREE TIMES A DAY; Therapy: (Recorded:74Vcc7567) to Recorded   20. TraZODone HCl TABS; Therapy: (Recorded:79Mgf6750) to Recorded   21. Trileptal 300 MG Oral Tablet; TAKE 1 TABLET IN ADDITION TO 150MG TABLET TO TOTAL    450MG BID; Therapy: (Nelly Michelle) to Recorded   22. ValACYclovir HCl - 1 GM Oral Tablet; Therapy: (Kurtis Durham) to Recorded   23. Vitamin D CAPS; Therapy: (Kurtis Durham) to Recorded   24. Voltaren GEL; Therapy: (Kurtis Durham) to Recorded    Past Medical History   1. Acute sinusitis (J01.90)   2. History of Alcoholism   3. History of depression (Z86.59)   4. History of nicotine dependence (Z87.891)   5. Palpitations (R00.2)   6. Stress Fracture    Surgical History   1. History of Back Surgery   2. History of Cholecystectomy   3. History of Hernia Repair   4. History of Hysterectomy   5.  History of Surgery    Family History  Mother   No pertinent family history  Family History   Family history of Breast Neoplasm  Family history of Coronary Artery Disease  Family history of hypertension (Z82.49)    Social History  Alcohol Use (History)  Current every day smoker (F17.200)  Current every day smoker (F17.200)  Drug Use  Living Independently  Physical Disability:    Review  Past medical history, problem list, family medical history, surgical [NS_DeliveryAttending1_OBGYN_ALL_OB_FT:[id number]] "history and social history reviewed. Vitals  Signs   Recorded: 36Fxg0625 01:48PM   Height: 5 ft 7 in  Weight: 296 lb   BMI Calculated: 46.36  BSA Calculated: 2.05  Systolic: 643, LUE, Sitting  Diastolic: 82, LUE, Sitting  Heart Rate: 97  Respiration: 16  O2 Saturation: 97    Physical Exam  Constitutional: alert, in no acute distress, current vital signs reviewed, well nourished and well developed . Patient has a flat and depressed affect, is tearful and morbidly obese. Head and Face: atraumatic, no deformities, normocephalic, normal facies. Eyes: no discharge, normal conjunctiva, no eyelid swelling, no ptosis and the sclerae were normal. pupils equal, round and reactive to light and accommodation, conjugate gaze and extraocular movements were intact. optic disc visualized with clear margin, no retinal hemorrhages and showed no papilledema. Neck: normal appearing neck, supple neck and no mass was seen. Pulmonary: no respiratory distress, normal respiratory rate and effort and no accessory muscle use. breath sounds clear to auscultation bilaterally. Cardiovascular: normal rate, no murmurs were heard, regular rhythm, normal S1 and normal S2. Nonpitting edema of ankles bilaterally. no right carotid bruit no left carotid bruit   Neurologic:   Mental status: The patient's orientation, memory, attention, language and fund of knowledge were normal. Thought content was clear. Judgement did not appear impaired. No hallucinations or delusions. She was tearful. Her affect was appropriate for her mood. Mood ""depressed. \"". Cranial Nerves: Cranial Nerve II: visual acuity and visual fields were intact. Cranial Nerves III, IV, and VI: the extraocular motions were intact, no nystagmus or ptosis. Cranial Nerve V: sensation to the face and masseter strength were intact. Cranial Nerve VII: facial strength was intact bilaterally. Cranial Nerve VIII: hearing was intact.   Cranial Nerves IX and X: there was normal " movement of the soft palate and normal gag. Cranial Nerve XI: shoulder shrug was intact bilaterally. Cranial Nerve XII: there was no tongue deviation with protrusion. Muscle Strength/Tone:. Muscle strength and tone were normal. No tremor was present. Reflexes:   Deep tendon reflexes: 1+ right patella, 1+ left patella, 0 right ankle jerk and 0 left ankle jerk, but 2+ right biceps, 2+ left biceps, 2+ right triceps, 2+ left triceps, 2+ right brachioradialis and 2+ left brachioradialis. Toes downgoing bilaterally. Sensation: Symmetrical Distal Hypesthesia in the lower extremities. Absent to vibratory sensation on right leg. Coordination: No coordination deficits. FTN and YONIS intact. Foot tapping normal. HTS not performed secondary to body habitus. Gait and Station:. Steady casual gait with walker. Negative Romberg. Results/Data  MRI of the brain from 06/10/2016 reviewed. It does show a mucous retention cyst in the right maxillary sinus. Brain appears normal.    EEG not done. Carotid ultrasound shows no hemodynamically significant stenosis. Assessment   1. Chronic pain (G89.29)   2. Headache, migraine (G43.909)   3. Sleep apnea (G47.30)   4. Fibromyalgia (M79.7)    Plan    #1. Continue Topamax 100 mg nightly at bedtime. Consider dose increase if headache frequency does not improve by next visit. Consider Botox trial if migraines increases to more than 16 headache days per month. Currently averaging 8 headache days per month. #2. Continue naratriptan 2.5 mg twice a day as needed as a rescue drug. #3. Follow-up with psychiatry for management of bipolar disorder currently taking oxcarbazepine 450 mg twice a day, trazodone 350 mg nightly, and Xanax 2 mg 3 times daily as needed. #4. Control cardiovascular stroke risk factors of hypertension and hyperlipidemia. #5. Continue tizanidine 4 mg three times a day as needed for spasticity. #6. Continue CPAP use for obstructive sleep apnea  #7. Follow-up of failed back syndrome, chronic migraine, and obstructive sleep apnea in 6 months. Patient education completed on disease process, etiology & prognosis. Patient expresses understanding of the plan. Proper usage and side effects of medications reviewed & discussed. Scribe Attestation  Scribe Attestation: Entered by Brenna Mcknight, acting as scribe for Dr. Deneen Heard. Provider Attestation: The documentation recorded by the scribe accurately reflects the service I personally performed and the decisions made by me, .       Signatures   Electronically signed by : Brenna Mcknight, ; Aug 27 2018  4:24PM CST (Scribe)    Electronically signed by : Deneen Heard M.D.; Aug 28 2018  1:37PM CST [NS_DeliveryAttending1_OBGYN_ALL_OB_FT:[id number]],[NS_DeliveryAssist1_OBGYN_ALL_OB_FT:[id number]],[NS_DeliveryRN_OBGYN_ALL_OB_FT:[id number]]

## 2025-07-15 NOTE — DISCHARGE NOTE OB - CARE PLAN
Assessment and plan of treatment:	Routine post  delivery care, Blood sugar control   Principal Discharge DX:	 delivery delivered  Assessment and plan of treatment:	Routine post  delivery care, Blood sugar control   1

## 2025-07-15 NOTE — DISCHARGE NOTE OB - CARE PROVIDERS DIRECT ADDRESSES
Discussed with Dr. Mayfield, pt should be seen this week for 32 week visit, needs fundal height, if no concerns can do video or televisit for 34 week appt.  Call to pt, advised of recommendation, verbalizes understanding.   ,DirectAddress_Unknown

## 2025-07-15 NOTE — OB PROVIDER DELIVERY SUMMARY - NSLOWPPHRISK_OBGYN_A_OB
"Servando Mccartney is a 46year old female here for  Chief Complaint   Patient presents with   â¢ Follow-up     Antiphospholipid antibody syndrome     Denies latex allergy or sensitivity. Medication verified, no changes. PCP and Pharmacy verified. Social History     Tobacco Use   Smoking Status Never Smoker   Smokeless Tobacco Never Used     Advance Directives Filed: No    ECO - Fully active, able to carry on all predisease activities without restrictions. Height: No.  Ht Readings from Last 1 Encounters:   19 5' 4"" (1.626 m)     Weight:Yes, shoes off. Wt Readings from Last 3 Encounters:   19 83.9 kg   18 81.6 kg   18 79.4 kg       BMI: Body mass index is 31.75 kg/mÂ².     REVIEW OF SYSTEMS  GENERAL:  Patient denies headache, fevers, chills, night sweats, change in appetite, weight loss, dizziness, but complains of: excessive fatigue  ALLERGIC/IMMUNOLOGIC: Verified allergies: Yes  EYES:  Patient denies significant visual difficulties, double vision, blurred vision  ENT/MOUTH: Patient denies problems with hearing, sore throat, sinus drainage, mouth sores  ENDOCRINE:  Patient denies diabetes, thyroid disease, hormone replacement, hot flashes  HEMATOLOGIC/LYMPHATIC: Patient denies bleeding, tender lymph nodes, swollen lymph nodes, but complains of: easy bruising  BREASTS: Patient denies abnormal masses of breast, nipple discharge, pain  RESPIRATORY:  Patient denies lung pain with breathing, cough, coughing up blood, shortness of breath  CARDIOVASCULAR:  Patient denies anginal chest pain, palpitations, shortness of breath when lying flat, peripheral edema  GASTROINTESTINAL: Patient denies abdominal pain , nausea, vomiting, diarrhea, GI bleeding, constipation, change in bowel habits, heartburn, sensation of feeling full, difficulty swallowing  : Patient denies abnormal genital masses, blood in the urine, frequency, urgency, burning with urination, hesitancy, incontinence, vaginal " bleeding, discharge  MUSCULOSKELETAL:  Patient denies bone pain, redness, decreased range of motion, but complains of: joint pain, pain ratin, location: when having a flare up and joint swelling when flared up  SKIN:  Patient denies chronic rashes, inflammation, ulcerations, skin changes, itching  NEUROLOGIC:  Patient denies loss of balance, areas of focal weakness, abnormal gait, sensory problems, numbness, tingling  PSYCHIATRIC: Patient denies insomnia, depression, anxiety No previous uterine incision/Guzman Pregnancy/Less than or equal to 4 previous vaginal births/No known bleeding disorder/No history of postpartum hemorrhage

## 2025-07-15 NOTE — DISCHARGE NOTE OB - MEDICATION SUMMARY - MEDICATIONS TO TAKE
I will START or STAY ON the medications listed below when I get home from the hospital:    ibuprofen 600 mg oral tablet  -- 1 tab(s) by mouth every 6 hours  -- Indication: For pain    acetaminophen 325 mg oral tablet  -- 3 tab(s) by mouth every 6 hours  -- Indication: For pain    NIFEdipine 30 mg oral tablet, extended release  -- 1 tab(s) by mouth once a day  -- Indication: For HTN HOLD FOR BP LESS THAN 110/60 OR HR LESS THAN 60    PNV Prenatal oral tablet  -- 1 tab(s) by mouth once a day  -- Indication: For POSTPARTUM    ferrous sulfate 325 mg (65 mg elemental iron) oral tablet  -- 1 tab(s) by mouth once a day  -- Indication: For ANEMIA

## 2025-07-15 NOTE — OB PROVIDER DELIVERY SUMMARY - NSPROVIDERDELIVERYNOTE_OBGYN_ALL_OB_FT
Primary low transverse  section via a modified Lion-Wells technique under spinal anesthesia for recent primary HSV outbreak. Female infant delivered from [facility]. Delayed cord clamping x 30 sec, infant passed to Pediatrics in attendance. Segment of cord isolated for cord gases. Placenta expressed from the endometrium intact. Ace retractor inserted for exposure for hysterotomy repair. Hysterotomy closed with a continuous modified mattress stitch using 1-Maxon suture. Rectus mm approximated with 1 interrupted suture of 1-Maxon. Fascia closed with a running stitch of 0-Vicryl. Sub-q approximated with 2-0 plain. Skin closed with a subcuticular stitch. EBL 1153cc, IVF 1200cc, U/O 100cc. mother transferred to PACU in stable condition, infant being evaluated by pediatrics. Primary low transverse  section via a modified Lion-Wells technique under spinal anesthesia for recent primary HSV outbreak. Female infant delivered from [facility]. Delayed cord clamping x 30 sec, infant passed to Pediatrics in attendance. Segment of cord isolated for cord gases. Placenta expressed from the endometrium intact. Ace retractor inserted for exposure for hysterotomy repair. Hysterotomy closed with a continuous modified mattress stitch using 1-Maxon suture. Rectus mm approximated with 1 interrupted suture of 1-Maxon. Fascia closed with a running stitch of 0-Vicryl. Sub-q approximated with 2-0 plain. Skin closed with a subcuticular stitch. EBL 1153cc, IVF 1200cc, U/O 100cc. mother transferred to PACU in stable condition, infant being evaluated by pediatrics. APGARs 8/8    Dictation: #[id number]

## 2025-07-15 NOTE — OB NEONATOLOGY/PEDIATRICIAN DELIVERY SUMMARY - NSPEDSNEONOTESA_OBGYN_ALL_OB_FT
Baby is a 37 wk LGA female born to a 41yo G1PO mother via CS for active HSV lesions. Peds not called to delivery. Maternal history significant for anxiety, type 2 DM, and HSV on valtrex (not primary infection). Prenatal history uncomplicated. Mom’s blood type O+. PNL RPR NR, HIV negative, HepB neg, and rubella immune. GBS negative on . AROM at delivery, clear fluids. Baby born vigorous and crying spontaneously. APGARS 8/8. Mom plans to breastfeed and declines HepB. Peds called to LDR at 6 MOL for initiation of CPAP for low O2 saturation and retractions. Max settings 5/65%, weaned to 21%. Admitted to NICU for continued increased work of breathing at 46 MOL requiring CPAP.     BW: 3940g  TOB: 17:49  : 7/15

## 2025-07-15 NOTE — DISCHARGE NOTE OB - CARE PROVIDER_API CALL
Rena Aguirre)  Obstetrics & Gynecology  [address] [place] [id number]  Phone: [phone]  Fax: [phone]  Follow Up Time:

## 2025-07-15 NOTE — CONSULT NOTE ADULT - SUBJECTIVE AND OBJECTIVE BOX
MATERNAL FETAL MEDICINE CONSULT NOTE     CC:     HPI:  She denies contractions, leakage of fluid, vaginal bleeding, decreased fetal movement.  Denies headache, changes in vision, chest pain, shortness of breath, RUQ pain.     HISTORIES:  OB:   Gyn:  PMH:   PSH:   ALL:   Meds:   FHx:     Vital Signs Last 24 Hours  T(C): 37 (07-15-25 @ 14:15), Max: 37.1 (07-15-25 @ 01:35)  HR: 69 (07-15-25 @ 14:15) (63 - 100)  BP: 126/73 (07-15-25 @ 14:15) (93/52 - 132/58)  RR: 18 (07-15-25 @ 14:15) (16 - 18)  SpO2: 99% (07-15-25 @ 14:15) (97% - 100%)    Physical Exam:  General: NAD  Abdomen: Soft, non-tender, gravid  Ext: No edema or tenderness    NST: Baseline , moderate variability, accelerations present, no decelerations  TOCO: No contractions    Labs:             13.6   5.84  )-----------( 272      ( 07-14 @ 19:45 )             41.1                 CAPILLARY BLOOD GLUCOSE      POCT Blood Glucose.: 67 mg/dL (15 Jul 2025 12:26)  POCT Blood Glucose.: 108 mg/dL (15 Jul 2025 10:04)  POCT Blood Glucose.: 60 mg/dL (15 Jul 2025 08:03)  POCT Blood Glucose.: 147 mg/dL (14 Jul 2025 22:56)  POCT Blood Glucose.: 62 mg/dL (14 Jul 2025 20:49)  POCT Blood Glucose.: 70 mg/dL (14 Jul 2025 16:41)      MEDICATIONS  (STANDING):  aspirin enteric coated 162 milliGRAM(s) Oral daily  cholecalciferol 2000 Unit(s) Oral daily  citric acid/sodium citrate Solution 30 milliLiter(s) Oral once  cyanocobalamin 1000 MICROGram(s) Oral daily  dextrose 5% + lactated ringers. 1000 milliLiter(s) (125 mL/Hr) IV Continuous <Continuous>  dextrose 5%. 1000 milliLiter(s) (100 mL/Hr) IV Continuous <Continuous>  dextrose 5%. 1000 milliLiter(s) (50 mL/Hr) IV Continuous <Continuous>  dextrose 50% Injectable 25 Gram(s) IV Push once  dextrose 50% Injectable 12.5 Gram(s) IV Push once  dextrose 50% Injectable 25 Gram(s) IV Push once  famotidine Injectable 20 milliGRAM(s) IV Push once  glucagon  Injectable 1 milliGRAM(s) IntraMuscular once  insulin lispro Injectable (ADMELOG) 10 Unit(s) SubCutaneous before breakfast  insulin lispro Injectable (ADMELOG) 10 Unit(s) SubCutaneous before lunch  insulin lispro Injectable (ADMELOG) 10 Unit(s) SubCutaneous before dinner  insulin NPH human recombinant 22 Unit(s) SubCutaneous at bedtime  lactated ringers. 1000 milliLiter(s) (125 mL/Hr) IV Continuous <Continuous>  prenatal multivitamin 1 Tablet(s) Oral daily  valACYclovir 500 milliGRAM(s) Oral two times a day    MEDICATIONS  (PRN):  dextrose Oral Gel 15 Gram(s) Oral once PRN Blood Glucose LESS THAN 70 milliGRAM(s)/deciliter   MATERNAL FETAL MEDICINE CONSULT NOTE     CC: 41yo  @ 37w0d presented to L&D triage from office for variables on NST. Pt also with uncontrolled type II diabetes, on Lispro 10/10/10 and NPH 22u at bedtime. Pt reports her insulin has been gradually uptitrated in the last few weeks.   Pt reports her glucose were controlled prior to pregnancy.She denies contractions, leakage of fluid, vaginal bleeding, decreased fetal movement.    HBA1C:  6.2% on 2025 at 9 wks-----> 7.3 @    Finger stick review per diabetes educator on ()  FB-94mg/dL, 1x elevated.  PPBG Breakfast: 153, 96, 123mg/dL, 1x elevated. Took 8-10units lispro 3x.  PPBG Lunch: 158, 173, 180mg/dL, all 3x elevated when she tested. She did not test the one time she took 10units.  PPBG Dinner: 199, 192, 183mg/dL, all 3xelevated. Pt only took 8units 1x with the 192mg/dl.    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       Vital Signs Last 24 Hours  T(C): 37 (07-15-25 @ 14:15), Max: 37.1 (07-15-25 @ 01:35)  HR: 69 (07-15-25 @ 14:15) (63 - 100)  BP: 126/73 (07-15-25 @ 14:15) (93/52 - 132/58)  RR: 18 (07-15-25 @ 14:15) (16 - 18)  SpO2: 99% (07-15-25 @ 14:15) (97% - 100%)    Physical Exam:  General: NAD  Abdomen: Soft, non-tender, gravid  Ext: No edema or tenderness    NST: Baseline 140 , moderate variability, accelerations present, no decelerations  TOCO: No contractions    Labs:             13.6   5.84  )-----------( 272      ( 14 @ 19:45 )             41.1     CAPILLARY BLOOD GLUCOSE      POCT Blood Glucose.: 67 mg/dL (15 Jul 2025 12:26)  POCT Blood Glucose.: 108 mg/dL (15 Jul 2025 10:04)  POCT Blood Glucose.: 60 mg/dL (15 Jul 2025 08:03)  POCT Blood Glucose.: 147 mg/dL (2025 22:56)  POCT Blood Glucose.: 62 mg/dL (2025 20:49)  POCT Blood Glucose.: 70 mg/dL (2025 16:41)      MEDICATIONS  (STANDING):  aspirin enteric coated 162 milliGRAM(s) Oral daily  cholecalciferol 2000 Unit(s) Oral daily  citric acid/sodium citrate Solution 30 milliLiter(s) Oral once  cyanocobalamin 1000 MICROGram(s) Oral daily  dextrose 5% + lactated ringers. 1000 milliLiter(s) (125 mL/Hr) IV Continuous <Continuous>  dextrose 5%. 1000 milliLiter(s) (100 mL/Hr) IV Continuous <Continuous>  dextrose 5%. 1000 milliLiter(s) (50 mL/Hr) IV Continuous <Continuous>  dextrose 50% Injectable 25 Gram(s) IV Push once  dextrose 50% Injectable 12.5 Gram(s) IV Push once  dextrose 50% Injectable 25 Gram(s) IV Push once  famotidine Injectable 20 milliGRAM(s) IV Push once  glucagon  Injectable 1 milliGRAM(s) IntraMuscular once  insulin lispro Injectable (ADMELOG) 10 Unit(s) SubCutaneous before breakfast  insulin lispro Injectable (ADMELOG) 10 Unit(s) SubCutaneous before lunch  insulin lispro Injectable (ADMELOG) 10 Unit(s) SubCutaneous before dinner  insulin NPH human recombinant 22 Unit(s) SubCutaneous at bedtime  lactated ringers. 1000 milliLiter(s) (125 mL/Hr) IV Continuous <Continuous>  prenatal multivitamin 1 Tablet(s) Oral daily  valACYclovir 500 milliGRAM(s) Oral two times a day    MEDICATIONS  (PRN):  dextrose Oral Gel 15 Gram(s) Oral once PRN Blood Glucose LESS THAN 70 milliGRAM(s)/deciliter   MATERNAL FETAL MEDICINE CONSULT NOTE     CC: 39yo  @ 37w0d presented to L&D triage from office for variables on NST. Pt also with uncontrolled type II diabetes, on Lispro 10/10/10 and NPH 22u at bedtime. Pt reports her insulin has been gradually uptitrated in the last few weeks.   Pt reports her glucose were controlled prior to pregnancy. She denies contractions, leakage of fluid, vaginal bleeding, decreased fetal movement.    HBA1C:  6.2% on 2025 at 9 wks-----> 7.3 @    Finger stick review per diabetes educator on ()  FB-94mg/dL, 1x elevated.  PPBG Breakfast: 153, 96, 123mg/dL, 1x elevated. Took 8-10units lispro 3x.  PPBG Lunch: 158, 173, 180mg/dL, all 3x elevated when she tested. She did not test the one time she took 10units.  PPBG Dinner: 199, 192, 183mg/dL, all 3xelevated. Pt only took 8units 1x with the 192mg/dl.    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       Vital Signs Last 24 Hours  T(C): 37 (07-15-25 @ 14:15), Max: 37.1 (07-15-25 @ 01:35)  HR: 69 (07-15-25 @ 14:15) (63 - 100)  BP: 126/73 (07-15-25 @ 14:15) (93/52 - 132/58)  RR: 18 (07-15-25 @ 14:15) (16 - 18)  SpO2: 99% (07-15-25 @ 14:15) (97% - 100%)    Physical Exam:  General: NAD  Abdomen: Soft, non-tender, gravid  Ext: No edema or tenderness    NST: Baseline 140 , moderate variability, accelerations present, no decelerations  TOCO: No contractions    Labs:             13.6   5.84  )-----------( 272      ( 14 @ 19:45 )             41.1     CAPILLARY BLOOD GLUCOSE      POCT Blood Glucose.: 67 mg/dL (15 Jul 2025 12:26)  POCT Blood Glucose.: 108 mg/dL (15 Jul 2025 10:04)  POCT Blood Glucose.: 60 mg/dL (15 Jul 2025 08:03)  POCT Blood Glucose.: 147 mg/dL (2025 22:56)  POCT Blood Glucose.: 62 mg/dL (2025 20:49)  POCT Blood Glucose.: 70 mg/dL (2025 16:41)      MEDICATIONS  (STANDING):  aspirin enteric coated 162 milliGRAM(s) Oral daily  cholecalciferol 2000 Unit(s) Oral daily  citric acid/sodium citrate Solution 30 milliLiter(s) Oral once  cyanocobalamin 1000 MICROGram(s) Oral daily  dextrose 5% + lactated ringers. 1000 milliLiter(s) (125 mL/Hr) IV Continuous <Continuous>  dextrose 5%. 1000 milliLiter(s) (100 mL/Hr) IV Continuous <Continuous>  dextrose 5%. 1000 milliLiter(s) (50 mL/Hr) IV Continuous <Continuous>  dextrose 50% Injectable 25 Gram(s) IV Push once  dextrose 50% Injectable 12.5 Gram(s) IV Push once  dextrose 50% Injectable 25 Gram(s) IV Push once  famotidine Injectable 20 milliGRAM(s) IV Push once  glucagon  Injectable 1 milliGRAM(s) IntraMuscular once  insulin lispro Injectable (ADMELOG) 10 Unit(s) SubCutaneous before breakfast  insulin lispro Injectable (ADMELOG) 10 Unit(s) SubCutaneous before lunch  insulin lispro Injectable (ADMELOG) 10 Unit(s) SubCutaneous before dinner  insulin NPH human recombinant 22 Unit(s) SubCutaneous at bedtime  lactated ringers. 1000 milliLiter(s) (125 mL/Hr) IV Continuous <Continuous>  prenatal multivitamin 1 Tablet(s) Oral daily  valACYclovir 500 milliGRAM(s) Oral two times a day    MEDICATIONS  (PRN):  dextrose Oral Gel 15 Gram(s) Oral once PRN Blood Glucose LESS THAN 70 milliGRAM(s)/deciliter   MATERNAL FETAL MEDICINE CONSULT NOTE     CC: 41yo  @ 37w0d presented to L&D triage from office for variables on NST. Pt also with uncontrolled type II diabetes, on Lispro 10/10/10 and NPH 22u at bedtime. Pt reports her insulin has been gradually uptitrated in the last few weeks.   Pt reports her glucose were controlled prior to pregnancy. She denies contractions, leakage of fluid, vaginal bleeding, decreased fetal movement.    HBA1C:  6.2% on 2025 at 9 wks-----> 7.3 at 7/15/25    Finger stick review per diabetes educator on ()  FB-94mg/dL, 1x elevated.  PPBG Breakfast: 153, 96, 123mg/dL, 1x elevated. Took 8-10units lispro 3x.  PPBG Lunch: 158, 173, 180mg/dL, all 3x elevated when she tested. She did not test the one time she took 10units.  PPBG Dinner: 199, 192, 183mg/dL, all 3xelevated. Pt only took 8units 1x with the 192mg/dl.    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       Vital Signs Last 24 Hours  T(C): 37 (07-15-25 @ 14:15), Max: 37.1 (07-15-25 @ 01:35)  HR: 69 (07-15-25 @ 14:15) (63 - 100)  BP: 126/73 (07-15-25 @ 14:15) (93/52 - 132/58)  RR: 18 (07-15-25 @ 14:15) (16 - 18)  SpO2: 99% (07-15-25 @ 14:15) (97% - 100%)    Physical Exam:  General: NAD  Abdomen: Soft, non-tender, gravid  Ext: No edema or tenderness    NST: Baseline 140 , moderate variability, accelerations present, no decelerations  TOCO: No contractions    Labs:             13.6   5.84  )-----------( 272      ( 14 @ 19:45 )             41.1     CAPILLARY BLOOD GLUCOSE      POCT Blood Glucose.: 67 mg/dL (15 Jul 2025 12:26)  POCT Blood Glucose.: 108 mg/dL (15 Jul 2025 10:04)  POCT Blood Glucose.: 60 mg/dL (15 Jul 2025 08:03)  POCT Blood Glucose.: 147 mg/dL (2025 22:56)  POCT Blood Glucose.: 62 mg/dL (2025 20:49)  POCT Blood Glucose.: 70 mg/dL (2025 16:41)      MEDICATIONS  (STANDING):  aspirin enteric coated 162 milliGRAM(s) Oral daily  cholecalciferol 2000 Unit(s) Oral daily  citric acid/sodium citrate Solution 30 milliLiter(s) Oral once  cyanocobalamin 1000 MICROGram(s) Oral daily  dextrose 5% + lactated ringers. 1000 milliLiter(s) (125 mL/Hr) IV Continuous <Continuous>  dextrose 5%. 1000 milliLiter(s) (100 mL/Hr) IV Continuous <Continuous>  dextrose 5%. 1000 milliLiter(s) (50 mL/Hr) IV Continuous <Continuous>  dextrose 50% Injectable 25 Gram(s) IV Push once  dextrose 50% Injectable 12.5 Gram(s) IV Push once  dextrose 50% Injectable 25 Gram(s) IV Push once  famotidine Injectable 20 milliGRAM(s) IV Push once  glucagon  Injectable 1 milliGRAM(s) IntraMuscular once  insulin lispro Injectable (ADMELOG) 10 Unit(s) SubCutaneous before breakfast  insulin lispro Injectable (ADMELOG) 10 Unit(s) SubCutaneous before lunch  insulin lispro Injectable (ADMELOG) 10 Unit(s) SubCutaneous before dinner  insulin NPH human recombinant 22 Unit(s) SubCutaneous at bedtime  lactated ringers. 1000 milliLiter(s) (125 mL/Hr) IV Continuous <Continuous>  prenatal multivitamin 1 Tablet(s) Oral daily  valACYclovir 500 milliGRAM(s) Oral two times a day    MEDICATIONS  (PRN):  dextrose Oral Gel 15 Gram(s) Oral once PRN Blood Glucose LESS THAN 70 milliGRAM(s)/deciliter

## 2025-07-15 NOTE — DISCHARGE NOTE OB - FINANCIAL ASSISTANCE
[facility] provides services at a reduced cost to those who are determined to be eligible through [facility]’s financial assistance program. Information regarding [facility]’s financial assistance program can be found by going to [email or web address] or by calling [phone].

## 2025-07-15 NOTE — PROVIDER CONTACT NOTE (HYPOGLYCEMIA EVENT) - NS PROVIDER CONTACT BACKGROUND-HYPO
Age: 40y    Gender: Female    POCT Blood Glucose:  63 mg/dL (07-15-25 @ 23:26)  64 mg/dL (07-15-25 @ 23:19)  90 mg/dL (07-15-25 @ 17:38)  64 mg/dL (07-15-25 @ 16:54)  56 mg/dL (07-15-25 @ 16:38)  67 mg/dL (07-15-25 @ 12:26)  108 mg/dL (07-15-25 @ 10:04)  60 mg/dL (07-15-25 @ 08:03)      MD Allison and MD Sutherland made aware of hypoglycemic result. Pt to receive 8 fluid oz PO apple juice, RN to repeat BG in 15 minutes.

## 2025-07-15 NOTE — PROGRESS NOTE ADULT - ASSESSMENT
40y  @ 37wk with hx of poorly controlled T2DM admitted for glycemic control. Maternal and fetal status reassuring       #T2DM  - C/w home NPH 20u, Lispro 10/10/10  - FS fasting and postprandial  - NST BID      #Fetal wellbeing  - Monitoring as above  - f/u GBS      #Maternal wellbeing  - Regular diet  - hx of anogenital herpers, on valtrex  - HSQ/SCDs for DVT ppx  - PNV/Iron/Colace/Folic acid      Malgorzata Levy, PGY3

## 2025-07-15 NOTE — CONSULT NOTE ADULT - ASSESSMENT
41yo  at 37w0d with T2DM on insulin, sent from her primary OB’s office due to variables noted on NST.  Currently on an insulin regimen of NPH 22u qhs, Lispro 8-10u with meals. FS log reviewed and postprandial elevations noted ranging from 150-190s,  Additionally patient has history of anogenital herpes with most recent outbreak last week. She is currently taking valtrex. She is planned for primary cesaran section. No other complications in the pregnancy.    Upon arrival to L&D triage, the fetal tracing showed moderate variability, reactivity, no significant decelerations noted. Bedside sonogram performed by triage team showed BPP 8/8, DALJIT 25.8cm, vtx. Last growth sono on  EFW 2277g (82%), AC 87%. SVE was 0/0/-3. Random FS was 70. VS wnl.    Recommendations:     39yo  at 37w0d with T2DM on insulin, sent from her primary OB’s office due to variables noted on NST.  Currently on an insulin regimen of NPH 22u qhs, Lispro 8-10u with meals. FS log reviewed and postprandial elevations noted ranging from 150-190s, HBA1C elevated 7.3 @ 15. Additionally patient has history of anogenital herpes with most recent outbreak last week. She is currently taking valtrex 500 mg BID. Upon arrival to L&D triage, the fetal tracing showed moderate variability, reactivity, no significant decelerations noted. Bedside sonogram performed by triage team showed BPP 8/8, DALJIT 25.8cm, vtx. Last growth sono on  EFW 2277g (82%), AC 87%.     Recommendations:     39yo  at 37w0d with T2DM on insulin, sent from her primary OB’s office due to variables noted on NST.  Currently on an insulin regimen of NPH 22u qhs, Lispro 8-10u with meals. FS log reviewed and postprandial elevations noted ranging from 150-190s, HBA1C elevated 7.3 @ 15. Additionally patient has history of anogenital herpes with most recent outbreak last week. She is currently taking valtrex 500 mg BID. Upon arrival to L&D triage, the fetal tracing showed moderate variability, reactivity, no significant decelerations noted. Bedside sonogram performed by triage team showed BPP 8/8, DALJIT 25.8cm, vtx. Last growth sono on  EFW 2277g (82%), AC 87%.     Recommendations:  NPO  IVF  For 1' CS for uncontrolled DM and active genital herpes outbreak  Anesthesia, OB and Peds to be notified     Patient seen with Micky Enriquez Attending       Alfreda TAM Fellow      41yo  at 37w0d with T2DM on insulin, sent from her primary OB’s office due to variables noted on NST.  Currently on an insulin regimen of NPH 22u qhs, Lispro 8-10u with meals. FS log reviewed and postprandial elevations noted ranging from 150-190s, HBA1C elevated 7.3 @ 7/15. Additionally patient has history of anogenital herpes with most recent outbreak last week. She is currently taking valtrex 500 mg BID. Upon arrival to L&D triage, the fetal tracing showed moderate variability, reactivity, no significant decelerations noted. Bedside sonogram performed by triage team showed BPP 8/8, DALJIT 25.8cm, vtx. Growth sono today EFW 91 %tile and AC 97 %tile, DALJIT 15.4 , Vx    Patient was counselled on the need to deliver her before 38 weeks due to uncontrolled Type 2 DM absed on her recent FS log, A1C 7.3 and EFW 91 %tile and AC 97 %tile on today's growth scan. Also since she has active genital herpes outbreak, mode of delivery will be via 1' CS. She understands and agreed with the plan and desires to proceed with 1' C today.    Recommendations:  NPO  IVF  For 1' CS for uncontrolled DM and active genital herpes outbreak  Anesthesia, OB and Peds to be notified     Patient seen with Dr. Minor-ANEL Attending       Alfreda TAM Fellow

## 2025-07-15 NOTE — DISCHARGE NOTE OB - HOSPITAL COURSE
Admitted for control of DM, Primary LTCS 7/15 for recent primary  HSV outbreak, female infantr delivered 7/15/2025

## 2025-07-15 NOTE — CONSULT NOTE ADULT - ATTENDING COMMENTS
MFM ATTENDING    41yo P0 at 37w0d with T2DM on insulin sent yesterday from primary OB for delivery secondary to suboptimally controlled T2DM and variables on NST in the office. Patient also had barrier to obtaining equipment to check her sugars. When she arrived here she had a reassuring tracing and glucose was 60s-70s. Therefore delivery was not pursued.     She remained in house overnight. Remains asymptomatic.     This morning new information became available. A partially completed sugar log was provided which shows about 40% of postprandial sugars are elevated. Also her A1c resulted 7.3. Sono todays shows EFW 91%ile with AC 97%ile.     Given the patients risk factors, including suboptimally controlled T2DM, LGA, AMA, BMI, as well as barriers to adherence with checking her sugars, delivery is indicated at this gestational age.     Patient has h/o HSV outbreak which began on Thursday (5 days ago), reports she still has lesions (on admission she declined SSE but lesion noted on right labia), and that she was taking valtrex only once a day, not twice a day. Given this,  delivery is indicated.     Patient is agreeable to delivery today by .     All questions answered to patients satisfaction.    Discussed the above with covering OB, Dr. Smith, on the day of the encounter.     Plan:   NPO  IV fluids   this afternoon. Antepartum RN, Charge RN aware.

## 2025-07-15 NOTE — DISCHARGE NOTE OB - PATIENT PORTAL LINK FT
You can access the FollowMyHealth Patient Portal offered by [facility] by registering at the following website: [email or web address]. By joining [facility]’s FollowMyHealth portal, you will also be able to view your health information using other applications (apps) compatible with our system.

## 2025-07-15 NOTE — OB RN DELIVERY SUMMARY - BABY A: APGAR 1 MIN SCORE, DELIVERY
Patient Seen in: [facility]      History     Chief Complaint   Patient presents with    Flu     Stated Complaint: Congestion    Subjective:   HPI    Discussed 31-year-old female presents to the immediate care with complaint of sinus cough congestion and bodyaches started with fever yesterday.  She states her mom was just diagnosed with influenza B.  She states she no longer has asthma.  She denies shortness of breath, wheezing.      Objective:     Past Medical History:    Asthma (HCC)    Decorative tattoo    Esophageal reflux    Hyperlipidemia     (normal spontaneous vaginal delivery) (HCC)     (normal spontaneous vaginal delivery) (HCC)     (normal spontaneous vaginal delivery) (HCC)    Psoriasis    Visual impairment    glasses              Past Surgical History:   Procedure Laterality Date    Hemorrhoidectomy  2019    L anterior midline    Tubal ligation                  Social History     Socioeconomic History    Marital status:     Number of children: 3   Occupational History    Occupation: Coco Controller     Employer: [employer]    Tobacco Use    Smoking status: Former     Current packs/day: 0.00     Types: Cigarettes     Quit date: 2016     Years since quittin.8     Passive exposure: Never    Smokeless tobacco: Never   Vaping Use    Vaping status: Never Used   Substance and Sexual Activity    Alcohol use: No     Alcohol/week: 0.0 standard drinks of alcohol    Drug use: No    Sexual activity: Yes     Birth control/protection: Condom   Other Topics Concern    Caffeine Concern Yes     Comment: coffee,soda    Grew up on a farm No    History of tanning Yes    Outdoor occupation No    Pt has a pacemaker No    Pt has a defibrillator No    Breast feeding No    Reaction to local anesthetic No     Social Drivers of Health     Food Insecurity: Not on File (2024)    Received from [facility]    Food Insecurity     Food: 0   Transportation Needs: Not on File  (2023)    [facility] from [facility]    Transportation Needs     Transportation: 0   Housing Stability: Not on File (2023)    Received from [facility]    Housing Stability     Housin              Review of Systems    Positive for stated complaint: Congestion  Other systems are as noted in HPI.  Constitutional and vital signs reviewed.      All other systems reviewed and negative except as noted above.    Physical Exam     ED Triage Vitals [25 0938]   /71   Pulse 93   Resp 18   Temp 98.5 °F (36.9 °C)   Temp src Oral   SpO2 97 %   O2 Device None (Room air)       Current Vitals:   Vital Signs  BP: 125/71  Pulse: 93  Resp: 18  Temp: 98.5 °F (36.9 °C)  Temp src: Oral    Oxygen Therapy  SpO2: 97 %  O2 Device: None (Room air)        Physical Exam  Vital signs reviewed.  Nursing note reviewed.  Constitutional: Alert, well-appearing  Head: Normocephalic, atraumatic  Eyes: Extraocular muscles intact, pupils equal  Cardiovascular: Regular rate and rhythm  Pulmonary: Effort normal, breath sounds normal  Skin: Warm and dry  Musculoskeletal range of motion grossly normal all extremities  Neuro: Alert, at baseline, no focal neuro deficit.  Moves all extremities against gravity  Psych: Mood normal          ED Course     Labs Reviewed   POCT FLU TEST - Abnormal; Notable for the following components:       Result Value    POCT INFLUENZA A Positive (*)     All other components within normal limits    Narrative:     This assay is a rapid molecular in vitro test utilizing nucleic acid amplification of influenza A and B viral RNA.                   MDM      Differential diagnosis COVID, flu    Patient is flu a positive.  Her symptoms started yesterday, therefore I will give her a prescription for Tamiflu.  She will also get a prescription for Tessalon Perles.  Discussed Tylenol/Motrin dosing to control body aches and fevers.  Discussed pushing lots of fluids.  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        Medical Decision Making      Disposition and Plan     Clinical Impression:  1. Influenza         Disposition:  Discharge  3/7/2025 10:23 am    Follow-up:  Chava Werner, DO  [address] [place] [address] [phone]    In 1 week  As needed          Medications Prescribed:  Current Discharge Medication List        START taking these medications    Details   oseltamivir (TAMIFLU) 75 MG Oral Cap Take 1 capsule (75 mg total) by mouth 2 (two) times daily for 5 days.  Qty: 10 capsule, Refills: 0      benzonatate 100 MG Oral Cap Take 1 capsule (100 mg total) by mouth 3 (three) times daily as needed for cough.  Qty: 20 capsule, Refills: 0                 Supplementary Documentation:                                                                 8

## 2025-07-15 NOTE — DISCHARGE NOTE OB - MEDICATION SUMMARY - MEDICATIONS TO STOP TAKING
I will STOP taking the medications listed below when I get home from the hospital:    aspirin 81 mg oral tablet  -- orally    HumuLIN N 100 units/mL subcutaneous suspension  -- subcutaneous once a day (at bedtime) 22 units    HumaLOG 100 units/mL injectable solution  -- injectable once a day 10 to 12 units with meal

## 2025-07-15 NOTE — DISCHARGE NOTE OB - AVOID SITTING IN ONE POSITION FOR MORE THAN ONE HOUR
[facility], [address], [address] [place], [place]  Physical Therapy Re-Certification Plan of Ye Renae      Dear Dr. Michael James  ,    We had the pleasure of treating the following patient for physical therapy services at [address] [place] [address]. A summary of our findings can be found in the updated assessment below. This includes our plan of care. If you have any questions or concerns regarding these findings, please do not hesitate to contact me at the office phone number checked above.   Thank you for the referral.     Physician Signature:________________________________Date:__________________  By signing above (or electronic signature), therapists plan is approved by physician    Date Range Of Visits: 22 - 22   Total Visits to Date: 15  Overall Response to Treatment:   [x]Patient is responding well to treatment and improvement is noted with regards  to goals   []Patient should continue to improve in reasonable time if they continue HEP   []Patient has plateaued and is no longer responding to skilled PT intervention    []Patient is getting worse and would benefit from return to referring MD   []Patient unable to adhere to initial POC   []Other:       Date:  2022    Patient Name:  Olman Marin    :  1940  MRN: [id number]  Restrictions/Precautions:    Medical/Treatment Diagnosis Information:  Diagnosis: W25.506 (ICD-10-CM) - 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, s/p fall  Treatment Diagnosis: L shoulder pain W49.341  Insurance/Certification information:  PT Insurance Information: [facility] [employer] auth, BMN, $40 cp  Physician Information:   Dr Michael James  Has the plan of care been signed (Y/N):        [x]  Yes  []  No     Date of Patient follow up with Physician: 22      Is this a Progress Report:     [x]  Yes ( see above)  []  No        If Yes:  Date Range for reporting period:  Beginnin22  Ending:   Progress report will be due (10 Rx or 30 days whichever is less):       Recertification will be due (POC Duration  / 90 days whichever is less): 22      Visit # Insurance Allowable Auth Required   In-person 13  through 22 []  Yes []  No    Telehealth   []  Yes []  No    Total            Functional Scale: FOTO shoulder 56/100 (56%)   Date assessed:  22      Therapy Diagnosis/Practice Pattern:I, surgical      Number of Comorbidities:  []0     [x]1-2    []3+    Latex Allergy:  []NO      [x]YES  Preferred Language for Healthcare:   [x]English       []other:      Pain level: 0-4/10     SUBJECTIVE:  Patient reports that her shoulder feels like it is getting stronger. OBJECTIVE:   Observation:   Test measurements:  PROM flexion 130 deg, ER 33 deg at 30 deg abd, AROM flexion 97 deg, abduction 88 deg, ER to C4, IR to belt line: MMT: flexion 4-/5, abduction 3+/5, ER 3+/5, IR 4-/5   RESTRICTIONS/PRECAUTIONS: see letter tab, AAROM flexion to 140, ER to 40, IR up the back; no IR strengthening until 13 weeks;  Latex Allergy    Exercises/Interventions:     Therapeutic Ex (04044) Sets/sec/Reps Notes/CUES   HEP, \"clock\" cues 3 to 9    HEP   HEP   Supine cane press and flex 3# 10 x 5\"    Sidelying ER 3\"  2 x 15    Sidelying abd  2 x 10 1# second set   Supine cane press 5# 2 x 10          Supine cane ER 5\" x 10    HEP, relax arm cues, improved   HEP w/ soft ball   Needs cueing   Seated AAROM flexion  10x    Seated AROM flexion 5x    GENTLE   Reviewed for form            Needs cueing   YTB IR 1 x 15    YTB ER 1 x 10 challenging   Wall slides 1/2 arc 10x ea R/L    Wall push ups 2 x 10    Patient ed  Progressing flexion ROM at home, ice, HEP progrsession   Manual Intervention (30734)     Gr II/III GH Jt mobilizations, PROM shoulder ER, flexion, and gentle abduction 12'                             NMR re-education (92271)  CUES NEEDED Therapeutic Activity (32344)                                     Therapeutic Exercise and NMR EXR  [x] (28888) Provided verbal/tactile cueing for activities related to strengthening, flexibility, endurance, ROM  for improvements in scapular, scapulothoracic and UE control with self care, reaching, carrying, lifting, house/yardwork, driving/computer work.    [] (57040) Provided verbal/tactile cueing for activities related to improving balance, coordination, kinesthetic sense, posture, motor skill, proprioception  to assist with  scapular, scapulothoracic and UE control with self care, reaching, carrying, lifting, house/yardwork, driving/computer work. Therapeutic Activities:    [] (14193 or 93745) Provided verbal/tactile cueing for activities related to improving balance, coordination, kinesthetic sense, posture, motor skill, proprioception and motor activation to allow for proper function of scapular, scapulothoracic and UE control with self care, carrying, lifting, driving/computer work.      Home Exercise Program:    [x] (24792) Reviewed/Progressed HEP activities related to strengthening, flexibility, endurance, ROM of scapular, scapulothoracic and UE control with self care, reaching, carrying, lifting, house/yardwork, driving/computer work  [] (26033) Reviewed/Progressed HEP activities related to improving balance, coordination, kinesthetic sense, posture, motor skill, proprioception of scapular, scapulothoracic and UE control with self care, reaching, carrying, lifting, house/yardwork, driving/computer work      Manual Treatments:  PROM / STM / Oscillations-Mobs:  G-I, II, III, IV (PA's, Inf., Post.)  [x] (95175) Provided manual therapy to mobilize soft tissue/joints of cervical/CT, scapular GHJ and UE for the purpose of modulating pain, promoting relaxation,  increasing ROM, reducing/eliminating soft tissue swelling/inflammation/restriction, improving soft tissue extensibility and allowing for proper ROM for normal function with self care, reaching, carrying, lifting, house/yardwork, driving/computer work    Modalities:  declined    Charges  Timed Code Treatment Minutes: 39'   Total Treatment Minutes: 45'     [] EVAL (LOW) 31826   [] EVAL (MOD) 84957   [] EVAL (HIGH) 51719   [] RE-EVAL     [x] VY(90556) x 2    [] IONTO  [] NMR (73876) x     [] VASO  [x] Manual (32292) x 1     [] Other:  [] TA x      [] Mech Traction (00129)  [] ES(attended) (86149)      [] ES (un) (91848):     GOALS:  Patient stated goal: full use of her L UE  [] Progressing: [] Met: [] Not Met: [] Adjusted    Therapist goals for Patient:   Short Term Goals: To be achieved in: 2 weeks  1. Independent in HEP and progression per patient tolerance, in order to prevent re-injury. [] Progressing: [x] Met: [] Not Met: [] Adjusted  2. Patient will have a decrease in pain to facilitate improvement in movement, function, and ADLs as indicated by Functional Deficits. [] Progressing: [x] Met: [] Not Met: [] Adjusted    Long Term Goals: To be achieved in: 6 more weeks from 8/17/22 (9/28/22)  1. Disability index score of 58% or better for the FOTO shoulder to assist with reaching prior level of function. [x] Progressing: [] Met: [] Not Met: [] Adjusted  2. Patient will demonstrate increased AROM to 150 deg elevation, IR to L5, ER to C5 to allow for proper joint functioning with dressing and ADLs. [x] Progressing: [] Met: [] Not Met: [] Adjusted  3. Patient will demonstrate an increase in Strength to grossly 4/5 L UE to allow for proper functional mobility with carrying groceries and laundry basket. [x] Progressing: [] Met: [] Not Met: [] Adjusted  4. Patient will return to over head reaching to put her dishes away functional activities without increased symptoms or restriction.    [x] Progressing: [] Met: [] Not Met: [] Adjusted     Progression Towards Functional goals:  [x] Patient is progressing as expected towards functional goals listed. [] Progression is slowed due to complexities listed. [] Progression has been slowed due to co-morbidities. [] Plan just implemented, too soon to assess goals progression  [] Other:     ASSESSMENT: Patient demonstrates improved AROM in all directions either in range itself or quality of movement. Demonstrates weakness throughout shoulder but this is expected due to the nature of injury, surgery, and patients age. She continues to improve in regards to goals set in PT. Would benefit from skilled PT to improve ROM and strength to become more independent with ADLs and IADLs. Overall Progression Towards Functional goals/ Treatment Progress Update:  [x] Patient is progressing as expected towards functional goals listed. [] Progression is slowed due to complexities/Impairments listed. [] Progression has been slowed due to co-morbidities. [] Plan just implemented, too soon to assess goals progression <30days   [] Goals require adjustment due to lack of progress  [] Patient is not progressing as expected and requires additional follow up with physician  [] Other    Prognosis for POC: [x] Good [] Fair  [] Poor      Patient requires continued skilled intervention: [x] Yes  [] No    Treatment/Activity Tolerance:  [x] Patient able to complete treatment  [] Patient limited by fatigue  [] Patient limited by pain    [] Patient limited by other medical complications  [] Other:     PLAN: 1x/wk for 6 more weeks  [x] Continue per plan of care [] Alter current plan (see comments above)  [] Plan of care initiated [] Hold pending MD visit [] Discharge      Electronically signed by:  Sonia Urena PT, DPT [id number]     Note: If patient does not return for scheduled/ recommended follow up visits, this note will serve as a discharge from care along with most recent update on progress. HEP instruction:   Access Code: [email or web address]  URL: [email or web address]  Date: 05/19/2022  Prepared by: Jaye Cm Exercises  Seated Scapular Retraction - 1-2 x daily - 7 x weekly - 1-2 sets - 10 reps  Seated Shoulder Cradle Shrug - 1-2 x daily - 7 x weekly - 1-2 sets - 10 reps  Isometric Shoulder Abduction at Wall - 1 x daily - 7 x weekly - 1-2 sets - 5-10 reps - 5 seconds hold  Elbow Flexion PROM - 1-2 x daily - 7 x weekly - 1-2 sets - 10 reps  Horizontal Shoulder Pendulum with Table Support - 1-2 x daily - 7 x weekly - 3 sets - 15-20 seconds hold  Seated Gripping Towel - 1-2 x daily - 7 x weekly - 2 sets - 10 reps Statement Selected

## 2025-07-15 NOTE — DISCHARGE NOTE OB - ADDITIONAL INSTRUCTIONS
Follow up @ [facility] office in 3days for PP BP Check  Monitor BP @ home 3 times daily (morning/noon/night)  keep a strict blood pressure log and bring to the office 3-4 days after hospital discharge for review  if you have BP > 160/100 call the office for further advise  if you have headaches, vision changes, upper abdominal pain call the office to notify and go to [facility] Triage for evaluation

## 2025-07-16 LAB
ALBUMIN SERPL ELPH-MCNC: 2.6 G/DL — LOW (ref 3.3–5)
ALBUMIN SERPL ELPH-MCNC: 2.7 G/DL — LOW (ref 3.3–5)
ALP SERPL-CCNC: 134 U/L — HIGH (ref 40–120)
ALP SERPL-CCNC: 141 U/L — HIGH (ref 40–120)
ALT FLD-CCNC: 10 U/L — SIGNIFICANT CHANGE UP (ref 4–33)
ALT FLD-CCNC: 9 U/L — SIGNIFICANT CHANGE UP (ref 4–33)
ANION GAP SERPL CALC-SCNC: 10 MMOL/L — SIGNIFICANT CHANGE UP (ref 7–14)
ANION GAP SERPL CALC-SCNC: 14 MMOL/L — SIGNIFICANT CHANGE UP (ref 7–14)
APPEARANCE UR: CLEAR — SIGNIFICANT CHANGE UP
AST SERPL-CCNC: 19 U/L — SIGNIFICANT CHANGE UP (ref 4–32)
AST SERPL-CCNC: 20 U/L — SIGNIFICANT CHANGE UP (ref 4–32)
BACTERIA # UR AUTO: NEGATIVE /HPF — SIGNIFICANT CHANGE UP
BASOPHILS # BLD AUTO: 0.02 K/UL — SIGNIFICANT CHANGE UP (ref 0–0.2)
BASOPHILS # BLD AUTO: 0.02 K/UL — SIGNIFICANT CHANGE UP (ref 0–0.2)
BASOPHILS NFR BLD AUTO: 0.2 % — SIGNIFICANT CHANGE UP (ref 0–2)
BASOPHILS NFR BLD AUTO: 0.2 % — SIGNIFICANT CHANGE UP (ref 0–2)
BILIRUB SERPL-MCNC: <0.2 MG/DL — SIGNIFICANT CHANGE UP (ref 0.2–1.2)
BILIRUB SERPL-MCNC: <0.2 MG/DL — SIGNIFICANT CHANGE UP (ref 0.2–1.2)
BILIRUB UR-MCNC: NEGATIVE — SIGNIFICANT CHANGE UP
BUN SERPL-MCNC: 7 MG/DL — SIGNIFICANT CHANGE UP (ref 7–23)
BUN SERPL-MCNC: 8 MG/DL — SIGNIFICANT CHANGE UP (ref 7–23)
CALCIUM SERPL-MCNC: 7.6 MG/DL — LOW (ref 8.4–10.5)
CALCIUM SERPL-MCNC: 8.2 MG/DL — LOW (ref 8.4–10.5)
CAST: 1 /LPF — SIGNIFICANT CHANGE UP (ref 0–4)
CHLORIDE SERPL-SCNC: 102 MMOL/L — SIGNIFICANT CHANGE UP (ref 98–107)
CHLORIDE SERPL-SCNC: 104 MMOL/L — SIGNIFICANT CHANGE UP (ref 98–107)
CO2 SERPL-SCNC: 20 MMOL/L — LOW (ref 22–31)
CO2 SERPL-SCNC: 21 MMOL/L — LOW (ref 22–31)
COLOR SPEC: YELLOW — SIGNIFICANT CHANGE UP
CREAT ?TM UR-MCNC: 41 MG/DL — SIGNIFICANT CHANGE UP
CREAT SERPL-MCNC: 0.7 MG/DL — SIGNIFICANT CHANGE UP (ref 0.5–1.3)
CREAT SERPL-MCNC: 0.78 MG/DL — SIGNIFICANT CHANGE UP (ref 0.5–1.3)
DIFF PNL FLD: ABNORMAL
EGFR: 111 ML/MIN/1.73M2 — SIGNIFICANT CHANGE UP
EGFR: 111 ML/MIN/1.73M2 — SIGNIFICANT CHANGE UP
EGFR: 97 ML/MIN/1.73M2 — SIGNIFICANT CHANGE UP
EGFR: 97 ML/MIN/1.73M2 — SIGNIFICANT CHANGE UP
EOSINOPHIL # BLD AUTO: 0.01 K/UL — SIGNIFICANT CHANGE UP (ref 0–0.5)
EOSINOPHIL # BLD AUTO: 0.06 K/UL — SIGNIFICANT CHANGE UP (ref 0–0.5)
EOSINOPHIL NFR BLD AUTO: 0.1 % — SIGNIFICANT CHANGE UP (ref 0–6)
EOSINOPHIL NFR BLD AUTO: 0.7 % — SIGNIFICANT CHANGE UP (ref 0–6)
GLUCOSE BLDC GLUCOMTR-MCNC: 103 MG/DL — HIGH (ref 70–99)
GLUCOSE BLDC GLUCOMTR-MCNC: 129 MG/DL — HIGH (ref 70–99)
GLUCOSE BLDC GLUCOMTR-MCNC: 140 MG/DL — HIGH (ref 70–99)
GLUCOSE BLDC GLUCOMTR-MCNC: 146 MG/DL — HIGH (ref 70–99)
GLUCOSE BLDC GLUCOMTR-MCNC: 156 MG/DL — HIGH (ref 70–99)
GLUCOSE BLDC GLUCOMTR-MCNC: 90 MG/DL — SIGNIFICANT CHANGE UP (ref 70–99)
GLUCOSE SERPL-MCNC: 156 MG/DL — HIGH (ref 70–99)
GLUCOSE SERPL-MCNC: 166 MG/DL — HIGH (ref 70–99)
GLUCOSE UR QL: NEGATIVE MG/DL — SIGNIFICANT CHANGE UP
HCT VFR BLD CALC: 29 % — LOW (ref 34.5–45)
HCT VFR BLD CALC: 29.4 % — LOW (ref 34.5–45)
HCT VFR BLD CALC: 32.6 % — LOW (ref 34.5–45)
HGB BLD-MCNC: 10.8 G/DL — LOW (ref 11.5–15.5)
HGB BLD-MCNC: 9.9 G/DL — LOW (ref 11.5–15.5)
HGB BLD-MCNC: 9.9 G/DL — LOW (ref 11.5–15.5)
IMM GRANULOCYTES # BLD AUTO: 0.03 K/UL — SIGNIFICANT CHANGE UP (ref 0–0.07)
IMM GRANULOCYTES # BLD AUTO: 0.05 K/UL — SIGNIFICANT CHANGE UP (ref 0–0.07)
IMM GRANULOCYTES NFR BLD AUTO: 0.3 % — SIGNIFICANT CHANGE UP (ref 0–0.9)
IMM GRANULOCYTES NFR BLD AUTO: 0.5 % — SIGNIFICANT CHANGE UP (ref 0–0.9)
KETONES UR QL: NEGATIVE MG/DL — SIGNIFICANT CHANGE UP
LDH SERPL L TO P-CCNC: 236 U/L — HIGH (ref 135–225)
LDH SERPL L TO P-CCNC: 238 U/L — HIGH (ref 135–225)
LEUKOCYTE ESTERASE UR-ACNC: NEGATIVE — SIGNIFICANT CHANGE UP
LYMPHOCYTES # BLD AUTO: 2.38 K/UL — SIGNIFICANT CHANGE UP (ref 1–3.3)
LYMPHOCYTES # BLD AUTO: 2.53 K/UL — SIGNIFICANT CHANGE UP (ref 1–3.3)
LYMPHOCYTES NFR BLD AUTO: 22.7 % — SIGNIFICANT CHANGE UP (ref 13–44)
LYMPHOCYTES NFR BLD AUTO: 28.3 % — SIGNIFICANT CHANGE UP (ref 13–44)
MAGNESIUM SERPL-MCNC: 4.3 MG/DL — HIGH (ref 1.6–2.6)
MAGNESIUM SERPL-MCNC: 4.8 MG/DL — HIGH (ref 1.6–2.6)
MAGNESIUM SERPL-MCNC: 5.8 MG/DL — HIGH (ref 1.6–2.6)
MCHC RBC-ENTMCNC: 31.6 PG — SIGNIFICANT CHANGE UP (ref 27–34)
MCHC RBC-ENTMCNC: 32 PG — SIGNIFICANT CHANGE UP (ref 27–34)
MCHC RBC-ENTMCNC: 32.6 PG — SIGNIFICANT CHANGE UP (ref 27–34)
MCHC RBC-ENTMCNC: 33.1 G/DL — SIGNIFICANT CHANGE UP (ref 32–36)
MCHC RBC-ENTMCNC: 33.7 G/DL — SIGNIFICANT CHANGE UP (ref 32–36)
MCHC RBC-ENTMCNC: 34.1 G/DL — SIGNIFICANT CHANGE UP (ref 32–36)
MCV RBC AUTO: 93.9 FL — SIGNIFICANT CHANGE UP (ref 80–100)
MCV RBC AUTO: 95.3 FL — SIGNIFICANT CHANGE UP (ref 80–100)
MCV RBC AUTO: 96.7 FL — SIGNIFICANT CHANGE UP (ref 80–100)
MONOCYTES # BLD AUTO: 0.7 K/UL — SIGNIFICANT CHANGE UP (ref 0–0.9)
MONOCYTES # BLD AUTO: 0.74 K/UL — SIGNIFICANT CHANGE UP (ref 0–0.9)
MONOCYTES NFR BLD AUTO: 7.1 % — SIGNIFICANT CHANGE UP (ref 2–14)
MONOCYTES NFR BLD AUTO: 7.8 % — SIGNIFICANT CHANGE UP (ref 2–14)
NEUTROPHILS # BLD AUTO: 5.61 K/UL — SIGNIFICANT CHANGE UP (ref 1.8–7.4)
NEUTROPHILS # BLD AUTO: 7.29 K/UL — SIGNIFICANT CHANGE UP (ref 1.8–7.4)
NEUTROPHILS NFR BLD AUTO: 62.7 % — SIGNIFICANT CHANGE UP (ref 43–77)
NEUTROPHILS NFR BLD AUTO: 69.4 % — SIGNIFICANT CHANGE UP (ref 43–77)
NITRITE UR-MCNC: NEGATIVE — SIGNIFICANT CHANGE UP
NRBC # BLD AUTO: 0 K/UL — SIGNIFICANT CHANGE UP (ref 0–0)
NRBC # FLD: 0 K/UL — SIGNIFICANT CHANGE UP (ref 0–0)
NRBC BLD AUTO-RTO: 0 /100 WBCS — SIGNIFICANT CHANGE UP (ref 0–0)
PH UR: 6 — SIGNIFICANT CHANGE UP (ref 5–8)
PLATELET # BLD AUTO: 191 K/UL — SIGNIFICANT CHANGE UP (ref 150–400)
PLATELET # BLD AUTO: 197 K/UL — SIGNIFICANT CHANGE UP (ref 150–400)
PLATELET # BLD AUTO: 220 K/UL — SIGNIFICANT CHANGE UP (ref 150–400)
PMV BLD: 9.3 FL — SIGNIFICANT CHANGE UP (ref 7–13)
PMV BLD: 9.7 FL — SIGNIFICANT CHANGE UP (ref 7–13)
PMV BLD: 9.9 FL — SIGNIFICANT CHANGE UP (ref 7–13)
POTASSIUM SERPL-MCNC: 4.4 MMOL/L — SIGNIFICANT CHANGE UP (ref 3.5–5.3)
POTASSIUM SERPL-MCNC: 4.4 MMOL/L — SIGNIFICANT CHANGE UP (ref 3.5–5.3)
POTASSIUM SERPL-SCNC: 4.4 MMOL/L — SIGNIFICANT CHANGE UP (ref 3.5–5.3)
POTASSIUM SERPL-SCNC: 4.4 MMOL/L — SIGNIFICANT CHANGE UP (ref 3.5–5.3)
PROT ?TM UR-MCNC: 5 MG/DL — SIGNIFICANT CHANGE UP
PROT SERPL-MCNC: 5.2 G/DL — LOW (ref 6–8.3)
PROT SERPL-MCNC: 5.4 G/DL — LOW (ref 6–8.3)
PROT UR-MCNC: NEGATIVE MG/DL — SIGNIFICANT CHANGE UP
PROT/CREAT UR-RTO: 0.1 RATIO — SIGNIFICANT CHANGE UP (ref 0–0.2)
RBC # BLD: 3.04 M/UL — LOW (ref 3.8–5.2)
RBC # BLD: 3.09 M/UL — LOW (ref 3.8–5.2)
RBC # BLD: 3.42 M/UL — LOW (ref 3.8–5.2)
RBC # FLD: 13.5 % — SIGNIFICANT CHANGE UP (ref 10.3–14.5)
RBC # FLD: 13.6 % — SIGNIFICANT CHANGE UP (ref 10.3–14.5)
RBC # FLD: 13.6 % — SIGNIFICANT CHANGE UP (ref 10.3–14.5)
RBC CASTS # UR COMP ASSIST: 12 /HPF — HIGH (ref 0–4)
SODIUM SERPL-SCNC: 133 MMOL/L — LOW (ref 135–145)
SODIUM SERPL-SCNC: 138 MMOL/L — SIGNIFICANT CHANGE UP (ref 135–145)
SP GR SPEC: 1.01 — SIGNIFICANT CHANGE UP (ref 1–1.03)
SQUAMOUS # UR AUTO: 0 /HPF — SIGNIFICANT CHANGE UP (ref 0–5)
T PALLIDUM AB TITR SER: NEGATIVE — SIGNIFICANT CHANGE UP
URATE SERPL-MCNC: 5.1 MG/DL — SIGNIFICANT CHANGE UP (ref 2.5–7)
URATE SERPL-MCNC: 5.2 MG/DL — SIGNIFICANT CHANGE UP (ref 2.5–7)
UROBILINOGEN FLD QL: 0.2 MG/DL — SIGNIFICANT CHANGE UP (ref 0.2–1)
WBC # BLD: 10.49 K/UL — SIGNIFICANT CHANGE UP (ref 3.8–10.5)
WBC # BLD: 7.43 K/UL — SIGNIFICANT CHANGE UP (ref 3.8–10.5)
WBC # BLD: 8.95 K/UL — SIGNIFICANT CHANGE UP (ref 3.8–10.5)
WBC # FLD AUTO: 10.49 K/UL — SIGNIFICANT CHANGE UP (ref 3.8–10.5)
WBC # FLD AUTO: 7.43 K/UL — SIGNIFICANT CHANGE UP (ref 3.8–10.5)
WBC # FLD AUTO: 8.95 K/UL — SIGNIFICANT CHANGE UP (ref 3.8–10.5)
WBC UR QL: 0 /HPF — SIGNIFICANT CHANGE UP (ref 0–5)

## 2025-07-16 RX ORDER — IBUPROFEN 200 MG
600 TABLET ORAL EVERY 6 HOURS
Refills: 0 | Status: DISCONTINUED | OUTPATIENT
Start: 2025-07-16 | End: 2025-07-18

## 2025-07-16 RX ORDER — SENNA 187 MG
1 TABLET ORAL
Refills: 0 | Status: DISCONTINUED | OUTPATIENT
Start: 2025-07-16 | End: 2025-07-18

## 2025-07-16 RX ORDER — SODIUM CHLORIDE 9 G/1000ML
1000 INJECTION, SOLUTION INTRAVENOUS
Refills: 0 | Status: DISCONTINUED | OUTPATIENT
Start: 2025-07-16 | End: 2025-07-16

## 2025-07-16 RX ORDER — FERROUS SULFATE 137(45) MG
325 TABLET, EXTENDED RELEASE ORAL DAILY
Refills: 0 | Status: DISCONTINUED | OUTPATIENT
Start: 2025-07-16 | End: 2025-07-18

## 2025-07-16 RX ADMIN — Medication 50 GM/HR: at 00:39

## 2025-07-16 RX ADMIN — Medication 600 MILLIGRAM(S): at 05:36

## 2025-07-16 RX ADMIN — Medication 600 MILLIGRAM(S): at 23:26

## 2025-07-16 RX ADMIN — Medication 600 MILLIGRAM(S): at 12:27

## 2025-07-16 RX ADMIN — Medication 975 MILLIGRAM(S): at 21:58

## 2025-07-16 RX ADMIN — Medication 600 MILLIGRAM(S): at 18:00

## 2025-07-16 RX ADMIN — Medication 50 GM/HR: at 19:10

## 2025-07-16 RX ADMIN — Medication 975 MILLIGRAM(S): at 10:20

## 2025-07-16 RX ADMIN — Medication 600 MILLIGRAM(S): at 02:12

## 2025-07-16 RX ADMIN — Medication 975 MILLIGRAM(S): at 17:00

## 2025-07-16 RX ADMIN — HEPARIN SODIUM 10000 UNIT(S): 1000 INJECTION INTRAVENOUS; SUBCUTANEOUS at 18:01

## 2025-07-16 RX ADMIN — SODIUM CHLORIDE 50 MILLILITER(S): 9 INJECTION, SOLUTION INTRAVENOUS at 02:23

## 2025-07-16 RX ADMIN — Medication 600 MILLIGRAM(S): at 11:34

## 2025-07-16 RX ADMIN — Medication 975 MILLIGRAM(S): at 02:43

## 2025-07-16 RX ADMIN — INSULIN LISPRO 1: 100 INJECTION, SOLUTION INTRAVENOUS; SUBCUTANEOUS at 08:33

## 2025-07-16 RX ADMIN — Medication 50 GM/HR: at 07:07

## 2025-07-16 RX ADMIN — Medication 600 MILLIGRAM(S): at 19:00

## 2025-07-16 RX ADMIN — Medication 975 MILLIGRAM(S): at 16:01

## 2025-07-16 RX ADMIN — Medication 975 MILLIGRAM(S): at 20:58

## 2025-07-16 RX ADMIN — Medication 600 MILLIGRAM(S): at 06:29

## 2025-07-16 RX ADMIN — Medication 600 MILLIGRAM(S): at 01:16

## 2025-07-16 RX ADMIN — Medication 975 MILLIGRAM(S): at 03:43

## 2025-07-16 RX ADMIN — HEPARIN SODIUM 10000 UNIT(S): 1000 INJECTION INTRAVENOUS; SUBCUTANEOUS at 05:36

## 2025-07-16 RX ADMIN — Medication 50 MILLILITER(S): at 17:00

## 2025-07-16 RX ADMIN — Medication 975 MILLIGRAM(S): at 09:20

## 2025-07-16 NOTE — PROVIDER CONTACT NOTE (OTHER) - ASSESSMENT
Patient awake and alert with no complaints. Denies dizziness, lightheadedness.
pt without headache or visual changes   Reflexes +2
Patient is asymptomatic.

## 2025-07-16 NOTE — PROVIDER CONTACT NOTE (OTHER) - BACKGROUND
s/p cs with QBL 1155
Patient is s/p mag sulfate. Patient is asymptomatic. Patient had procardia 30 xl due at 2200. Dose was held.
c/s 7/15/25 @ 1749, 37 weeks, qbl 1153, PEC w/ SF on magnesium started 7/15 @ 2039. Last dose of Procardia given 7/15 @ 2000. DM2.

## 2025-07-16 NOTE — PROGRESS NOTE ADULT - ASSESSMENT
A/P: 41yo POD#1 s/p pLTCS i/s/o recent HSV outbreak and uncontrolled T2DM, currently on Valtrex. C/b pre-eclampsia with severe features (BPs) started on [id number] and currently on magnesium x24hrs postpartum. Course further c/b PPH with QBL of 1153. Post operatively patient with no UOP s/p 500cc + 600cc bolus with appropriate UOP increase. Patient is stable and doing well post-operatively.      #sPEC   - cw BP monitoring, BP: 96/53 (07-16-25 @ 05:51) (90/50 - 148/83)  - on Mag sulfate for seizure prophylaxis, to be continued for 24 hours postpartum  - Remove gregorio after magnesium is completed  - denies severe features today   - baseline HELLP labs wnl  - [id number] (7/15 -)    #HSV, active  - continue Valtrex    #T2DM  - FS: 64->63->90->103  - Continue LDSS  - Hypoglycemia precautions ordered    #postpartum  - Hct: 41.1>32.6    - Continue regular diet.  - Increase ambulation.  - Continue motrin, tylenol, oxycodone PRN for pain control.    No Arellano, PGY1

## 2025-07-16 NOTE — PROVIDER CONTACT NOTE (OTHER) - SITUATION
Patient has low BP for 0600 vitals.     BP 96/53 MAP 67  SpO2 100   RR 18   Temp 97.7 Oral  HR 72
Patients blood pressure 89/46
BP 99/52  MAP 63  Temp 97.7   HR 76  RR 19   SpO2 100
Pt shaking since transfer from OR  Long Bp cuff placed on leg for evaluation

## 2025-07-16 NOTE — PROVIDER CONTACT NOTE (OTHER) - ACTION/TREATMENT ORDERED:
Provider made aware. Patient encouraged to drink PO fluids. No further orders given. CNM will consult with MD. Will continue to monitor.

## 2025-07-16 NOTE — PROVIDER CONTACT NOTE (OTHER) - RECOMMENDATIONS
NP Robert Vyas aware and alerted.  Encouraged to PO hydrate.
To be evaluated
MD Lisa Ochoa alerted and made aware. No interventions at this time.

## 2025-07-17 DIAGNOSIS — E11.9 TYPE 2 DIABETES MELLITUS WITHOUT COMPLICATIONS: ICD-10-CM

## 2025-07-17 DIAGNOSIS — E78.5 HYPERLIPIDEMIA, UNSPECIFIED: ICD-10-CM

## 2025-07-17 DIAGNOSIS — E66.9 OBESITY, UNSPECIFIED: ICD-10-CM

## 2025-07-17 LAB
ALBUMIN SERPL ELPH-MCNC: 2.8 G/DL — LOW (ref 3.3–5)
ALP SERPL-CCNC: 133 U/L — HIGH (ref 40–120)
ALT FLD-CCNC: 6 U/L — SIGNIFICANT CHANGE UP (ref 4–33)
ANION GAP SERPL CALC-SCNC: 9 MMOL/L — SIGNIFICANT CHANGE UP (ref 7–14)
AST SERPL-CCNC: 16 U/L — SIGNIFICANT CHANGE UP (ref 4–32)
BASOPHILS # BLD AUTO: 0.02 K/UL — SIGNIFICANT CHANGE UP (ref 0–0.2)
BASOPHILS NFR BLD AUTO: 0.3 % — SIGNIFICANT CHANGE UP (ref 0–2)
BILIRUB SERPL-MCNC: <0.2 MG/DL — SIGNIFICANT CHANGE UP (ref 0.2–1.2)
BUN SERPL-MCNC: 9 MG/DL — SIGNIFICANT CHANGE UP (ref 7–23)
CALCIUM SERPL-MCNC: 7.4 MG/DL — LOW (ref 8.4–10.5)
CHLORIDE SERPL-SCNC: 106 MMOL/L — SIGNIFICANT CHANGE UP (ref 98–107)
CO2 SERPL-SCNC: 22 MMOL/L — SIGNIFICANT CHANGE UP (ref 22–31)
CREAT SERPL-MCNC: 0.86 MG/DL — SIGNIFICANT CHANGE UP (ref 0.5–1.3)
EGFR: 86 ML/MIN/1.73M2 — SIGNIFICANT CHANGE UP
EGFR: 86 ML/MIN/1.73M2 — SIGNIFICANT CHANGE UP
EOSINOPHIL # BLD AUTO: 0.12 K/UL — SIGNIFICANT CHANGE UP (ref 0–0.5)
EOSINOPHIL NFR BLD AUTO: 1.9 % — SIGNIFICANT CHANGE UP (ref 0–6)
GLUCOSE BLDC GLUCOMTR-MCNC: 119 MG/DL — HIGH (ref 70–99)
GLUCOSE BLDC GLUCOMTR-MCNC: 76 MG/DL — SIGNIFICANT CHANGE UP (ref 70–99)
GLUCOSE BLDC GLUCOMTR-MCNC: 84 MG/DL — SIGNIFICANT CHANGE UP (ref 70–99)
GLUCOSE BLDC GLUCOMTR-MCNC: 99 MG/DL — SIGNIFICANT CHANGE UP (ref 70–99)
GLUCOSE SERPL-MCNC: 76 MG/DL — SIGNIFICANT CHANGE UP (ref 70–99)
HCT VFR BLD CALC: 31.1 % — LOW (ref 34.5–45)
HGB BLD-MCNC: 10.3 G/DL — LOW (ref 11.5–15.5)
IMM GRANULOCYTES # BLD AUTO: 0.02 K/UL — SIGNIFICANT CHANGE UP (ref 0–0.07)
IMM GRANULOCYTES NFR BLD AUTO: 0.3 % — SIGNIFICANT CHANGE UP (ref 0–0.9)
LYMPHOCYTES # BLD AUTO: 2.18 K/UL — SIGNIFICANT CHANGE UP (ref 1–3.3)
LYMPHOCYTES NFR BLD AUTO: 34.1 % — SIGNIFICANT CHANGE UP (ref 13–44)
MCHC RBC-ENTMCNC: 32 PG — SIGNIFICANT CHANGE UP (ref 27–34)
MCHC RBC-ENTMCNC: 33.1 G/DL — SIGNIFICANT CHANGE UP (ref 32–36)
MCV RBC AUTO: 96.6 FL — SIGNIFICANT CHANGE UP (ref 80–100)
MONOCYTES # BLD AUTO: 0.48 K/UL — SIGNIFICANT CHANGE UP (ref 0–0.9)
MONOCYTES NFR BLD AUTO: 7.5 % — SIGNIFICANT CHANGE UP (ref 2–14)
NEUTROPHILS # BLD AUTO: 3.57 K/UL — SIGNIFICANT CHANGE UP (ref 1.8–7.4)
NEUTROPHILS NFR BLD AUTO: 55.9 % — SIGNIFICANT CHANGE UP (ref 43–77)
NRBC # BLD AUTO: 0 K/UL — SIGNIFICANT CHANGE UP (ref 0–0)
NRBC # FLD: 0 K/UL — SIGNIFICANT CHANGE UP (ref 0–0)
NRBC BLD AUTO-RTO: 0 /100 WBCS — SIGNIFICANT CHANGE UP (ref 0–0)
PLATELET # BLD AUTO: 201 K/UL — SIGNIFICANT CHANGE UP (ref 150–400)
PMV BLD: 9.9 FL — SIGNIFICANT CHANGE UP (ref 7–13)
POTASSIUM SERPL-MCNC: 4.3 MMOL/L — SIGNIFICANT CHANGE UP (ref 3.5–5.3)
POTASSIUM SERPL-SCNC: 4.3 MMOL/L — SIGNIFICANT CHANGE UP (ref 3.5–5.3)
PROT SERPL-MCNC: 5.7 G/DL — LOW (ref 6–8.3)
RBC # BLD: 3.22 M/UL — LOW (ref 3.8–5.2)
RBC # FLD: 14 % — SIGNIFICANT CHANGE UP (ref 10.3–14.5)
SODIUM SERPL-SCNC: 137 MMOL/L — SIGNIFICANT CHANGE UP (ref 135–145)
WBC # BLD: 6.39 K/UL — SIGNIFICANT CHANGE UP (ref 3.8–10.5)
WBC # FLD AUTO: 6.39 K/UL — SIGNIFICANT CHANGE UP (ref 3.8–10.5)

## 2025-07-17 PROCEDURE — 99222 1ST HOSP IP/OBS MODERATE 55: CPT

## 2025-07-17 RX ORDER — OXYCODONE HYDROCHLORIDE 30 MG/1
5 TABLET ORAL
Refills: 0 | Status: DISCONTINUED | OUTPATIENT
Start: 2025-07-17 | End: 2025-07-18

## 2025-07-17 RX ADMIN — Medication 600 MILLIGRAM(S): at 05:18

## 2025-07-17 RX ADMIN — Medication 975 MILLIGRAM(S): at 22:15

## 2025-07-17 RX ADMIN — Medication 975 MILLIGRAM(S): at 14:06

## 2025-07-17 RX ADMIN — HEPARIN SODIUM 10000 UNIT(S): 1000 INJECTION INTRAVENOUS; SUBCUTANEOUS at 17:20

## 2025-07-17 RX ADMIN — Medication 600 MILLIGRAM(S): at 18:16

## 2025-07-17 RX ADMIN — Medication 80 MILLIGRAM(S): at 11:40

## 2025-07-17 RX ADMIN — Medication 600 MILLIGRAM(S): at 17:20

## 2025-07-17 RX ADMIN — Medication 975 MILLIGRAM(S): at 21:43

## 2025-07-17 RX ADMIN — Medication 600 MILLIGRAM(S): at 11:40

## 2025-07-17 RX ADMIN — OXYCODONE HYDROCHLORIDE 5 MILLIGRAM(S): 30 TABLET ORAL at 09:29

## 2025-07-17 RX ADMIN — Medication 600 MILLIGRAM(S): at 00:26

## 2025-07-17 RX ADMIN — Medication 30 MILLIGRAM(S): at 14:10

## 2025-07-17 RX ADMIN — Medication 325 MILLIGRAM(S): at 11:41

## 2025-07-17 RX ADMIN — Medication 975 MILLIGRAM(S): at 15:06

## 2025-07-17 RX ADMIN — Medication 975 MILLIGRAM(S): at 04:18

## 2025-07-17 RX ADMIN — Medication 600 MILLIGRAM(S): at 12:40

## 2025-07-17 RX ADMIN — Medication 975 MILLIGRAM(S): at 03:18

## 2025-07-17 RX ADMIN — MAGNESIUM HYDROXIDE 30 MILLILITER(S): 400 SUSPENSION ORAL at 11:40

## 2025-07-17 RX ADMIN — OXYCODONE HYDROCHLORIDE 5 MILLIGRAM(S): 30 TABLET ORAL at 08:29

## 2025-07-17 RX ADMIN — HEPARIN SODIUM 10000 UNIT(S): 1000 INJECTION INTRAVENOUS; SUBCUTANEOUS at 05:18

## 2025-07-17 NOTE — CONSULT NOTE ADULT - ASSESSMENT
Pt is a 34 y/o female 37 weeks gestation now s/p C/S.    Endocrine consulted for post-partum DM2 management.    T2DM  Obesity  DM diagnosis: Had "diabetic coma" 5 years ago  Last A1c: 14.9% at diagnosis-->5.3% (per patient pre-pregnancy)-->6.2% Jan 2025-->7.3% now  Endocrinologist: At [facility].  Home DM meds: Mounjaro pre-pregnancy. End-of-pregnancy was on NPH 22 units qhs and lispro 8-12 units  -Insulin requirements drop significantly post-partum. Hold standing insulin for now. Continue with low dose scales as below:  -Use low dose Admelog correction scale pre-meal  -Use low dose Admelog correction scale at bedtime  -Fingerstick BG before meals and bedtime  -Goal -180 for inpatients, though tighter control would be reasonable in this patient.   -Carbohydrate consistent diet  Discharge plan:  -Likely to discharge patient home on no medication for DM with close outpatient f/u. Did not tolerate metformin previously. Can consider GLP1a again depending on patient's breastfeeding plans, conception plans, etc.  -Recommend routine outpatient ophthalmology and PCP or endocrinology f/u    HLD  -Would likely benefit from a statin if no contraindication but would need reliable contraception    Corby Long, DO  Endocrinology    [address] [place] [address] [phone]    [address], [place] [address]  [phone]    Please note that a different Endocrine provider will be following this patient tomorrow.

## 2025-07-17 NOTE — PROGRESS NOTE ADULT - ASSESSMENT
Assessment and Plan:   · Assessment	  A/P: 39yo POD#2 s/p pLTCS i/s/o recent HSV outbreak and uncontrolled T2DM, currently on Valtrex. C/b pre-eclampsia with severe features (BPs) started on [id number] and currently on magnesium x24hrs postpartum. Course further c/b PPH with QBL of 1153. Post operatively patient with no UOP s/p 500cc + 600cc bolus with appropriate UOP increase. Patient is stable and doing well post-operatively.      #sPEC   - BP x 24 hours: BP:  (92/58 - 119/67)  -s/p Mag sulfate for seizure prophylaxis  - denies s/s of PEC   - baseline HELLP labs wnl  - continue taking Procardia xl 30mg daily  (dose held last night for low BP  -continue to monitor    #HSV, active  - continue Valtrex    Her pain is well controlled.   She is tolerating a regular diet and passing flatus.   Denies N/V. Denies CP/SOB/lightheadedness/dizziness.   She is ambulating without difficulty.   Voiding spontaneously.    Patient is stable and doing well post-operatively.    - Continue regular diet.  - Increase ambulation.  - Continue motrin, tylenol, oxycodone PRN for pain control.   -Encourage breastfeeding.  -Encourage abdominal binder use  -Encourage incentive spirometer use  -Incisional care and PO instructions reviewed.     Discharge planning      Discussed with MD Reza Caballero

## 2025-07-17 NOTE — CONSULT NOTE ADULT - SUBJECTIVE AND OBJECTIVE BOX
Pt is a 41 y/o female 37 weeks gestation now s/p C/S.    Endocrine consulted for post-partum DM2 management.    DM diagnosis: Had "diabetic coma" 5 years ago  Last A1c: 14.9% at diagnosis-->5.3% (per patient pre-pregnancy)-->6.2% Jan 2025-->7.3% now  Endocrinologist: At [facility].  Home DM meds: Mounjaro pre-pregnancy. End-of-pregnancy was on NPH 22 units qhs and lispro 8-12 units premeal.  Microvascular complications: No known nephropathy, retinopathy, or neuropathy.  Macrovascular complications: No known CVA, CAD, or PAD.  Appetite in the hospital: "Okay."  PMHx: As above. No known hx of pancreatitis or gallbladder disease.  FHx: No known FHx of thyroid cancer. Maternal great aunt had pancreatic cancer. Mom had DM.      PAST MEDICAL & SURGICAL HISTORY:  Prediabetes      No significant past surgical history          FAMILY HISTORY:      Social History:    Outpatient Medications: Home Medications:  aspirin 81 mg oral tablet: orally (14 Jul 2025 19:28)  D3 25 mcg (1000 intl units) oral capsule: 1 cap(s) orally once a day (14 Jul 2025 19:28)  HumaLOG 100 units/mL injectable solution: injectable once a day 10 to 12 units with meal (14 Jul 2025 19:28)  HumuLIN N 100 units/mL subcutaneous suspension: subcutaneous once a day (at bedtime) 22 units (14 Jul 2025 19:28)  PNV Prenatal oral tablet: 1 tab(s) orally once a day (14 Jul 2025 19:28)  thiamine: once a day (14 Jul 2025 19:28)  Valtrex 500 mg oral tablet: 1 tab(s) orally once a day (14 Jul 2025 19:28)      MEDICATIONS  (STANDING):  acetaminophen     Tablet .. 975 milliGRAM(s) Oral <User Schedule>  dextrose 5%. 1000 milliLiter(s) (50 mL/Hr) IV Continuous <Continuous>  dextrose 5%. 1000 milliLiter(s) (100 mL/Hr) IV Continuous <Continuous>  dextrose 50% Injectable 25 Gram(s) IV Push once  dextrose 50% Injectable 12.5 Gram(s) IV Push once  dextrose 50% Injectable 25 Gram(s) IV Push once  diphtheria/tetanus/pertussis (acellular) Vaccine (Adacel) 0.5 milliLiter(s) IntraMuscular once  ferrous    sulfate 325 milliGRAM(s) Oral daily  glucagon  Injectable 1 milliGRAM(s) IntraMuscular once  heparin   Injectable 40266 Unit(s) SubCutaneous every 12 hours  ibuprofen  Tablet. 600 milliGRAM(s) Oral every 6 hours  insulin lispro (ADMELOG) corrective regimen sliding scale   SubCutaneous three times a day before meals  insulin lispro (ADMELOG) corrective regimen sliding scale   SubCutaneous at bedtime  NIFEdipine XL 30 milliGRAM(s) Oral daily    MEDICATIONS  (PRN):  dextrose Oral Gel 15 Gram(s) Oral once PRN Blood Glucose LESS THAN 70 milliGRAM(s)/deciliter  diphenhydrAMINE 25 milliGRAM(s) Oral every 6 hours PRN Pruritus  lanolin Ointment 1 Application(s) Topical every 6 hours PRN Sore Nipples  magnesium hydroxide Suspension 30 milliLiter(s) Oral two times a day PRN Constipation  oxyCODONE    IR 5 milliGRAM(s) Oral once PRN Moderate to Severe Pain (4-10)  oxyCODONE    IR 5 milliGRAM(s) Oral every 3 hours PRN Moderate to Severe Pain (4-10)  senna 1 Tablet(s) Oral two times a day PRN Constipation  simethicone 80 milliGRAM(s) Chew every 4 hours PRN Gas      Allergies    No Known Allergies    Intolerances      PHYSICAL EXAM:  VITALS: T(C): 36.7 (07-17-25 @ 09:20)  T(F): 98 (07-17-25 @ 09:20), Max: 98.3 (07-17-25 @ 06:00)  HR: 78 (07-17-25 @ 14:06) (67 - 93)  BP: 119/67 (07-17-25 @ 14:06) (92/58 - 119/67)  RR:  (18 - 19)  SpO2:  (99% - 100%)  Wt(kg): --  General: Well-developed female, No acute distress, Speaking full sentences.   Eye:  Extraocular movements are intact, No proptosis.   HENT:  Normocephalic.   Respiratory:  Respirations are non-labored.   Musculoskeletal:  Normal range of motion, No gross joint swelling.   Mental Status Exam:  Orientedx4, Speech clear and coherent.   Neurologic:  Alert, Orientedx4, Normal motor function, No focal deficits, Cranial Nerves II-XII are grossly intact bilaterally.   Psychiatric:  Cooperative, Appropriate mood & affect.    POCT Blood Glucose.: 99 mg/dL (07-17-25 @ 11:41)  POCT Blood Glucose.: 84 mg/dL (07-17-25 @ 07:41)  POCT Blood Glucose.: 140 mg/dL (07-16-25 @ 21:01)  POCT Blood Glucose.: 129 mg/dL (07-16-25 @ 17:19)  POCT Blood Glucose.: 146 mg/dL (07-16-25 @ 11:41)  POCT Blood Glucose.: 156 mg/dL (07-16-25 @ 07:47)  POCT Blood Glucose.: 103 mg/dL (07-16-25 @ 00:52)  POCT Blood Glucose.: 90 mg/dL (07-15-25 @ 23:56)  POCT Blood Glucose.: 63 mg/dL (07-15-25 @ 23:26)  POCT Blood Glucose.: 64 mg/dL (07-15-25 @ 23:19)  POCT Blood Glucose.: 90 mg/dL (07-15-25 @ 17:38)  POCT Blood Glucose.: 64 mg/dL (07-15-25 @ 16:54)  POCT Blood Glucose.: 56 mg/dL (07-15-25 @ 16:38)  POCT Blood Glucose.: 67 mg/dL (07-15-25 @ 12:26)  POCT Blood Glucose.: 108 mg/dL (07-15-25 @ 10:04)  POCT Blood Glucose.: 60 mg/dL (07-15-25 @ 08:03)  POCT Blood Glucose.: 147 mg/dL (07-14-25 @ 22:56)  POCT Blood Glucose.: 62 mg/dL (07-14-25 @ 20:49)                            10.3   6.39  )-----------( 201      ( 17 Jul 2025 06:30 )             31.1       07-17    137  |  106  |  9   ----------------------------<  76  4.3   |  22  |  0.86    eGFR: 86    Ca    7.4[L]      07-17  Mg     5.80     07-16    TPro  5.7[L]  /  Alb  2.8[L]  /  TBili  <0.2  /  DBili  x   /  AST  16  /  ALT  6   /  AlkPhos  133[H]  07-17      Thyroid Function Tests:              Radiology:

## 2025-07-18 VITALS
SYSTOLIC BLOOD PRESSURE: 116 MMHG | DIASTOLIC BLOOD PRESSURE: 57 MMHG | HEART RATE: 86 BPM | TEMPERATURE: 98 F | OXYGEN SATURATION: 99 % | RESPIRATION RATE: 18 BRPM

## 2025-07-18 LAB
GLUCOSE BLDC GLUCOMTR-MCNC: 95 MG/DL — SIGNIFICANT CHANGE UP (ref 70–99)
GLUCOSE BLDC GLUCOMTR-MCNC: 98 MG/DL — SIGNIFICANT CHANGE UP (ref 70–99)

## 2025-07-18 PROCEDURE — 99232 SBSQ HOSP IP/OBS MODERATE 35: CPT

## 2025-07-18 RX ORDER — ASPIRIN 325 MG
0 TABLET ORAL
Refills: 0 | DISCHARGE

## 2025-07-18 RX ORDER — IBUPROFEN 200 MG
1 TABLET ORAL
Qty: 0 | Refills: 0 | DISCHARGE
Start: 2025-07-18

## 2025-07-18 RX ORDER — ACETAMINOPHEN 500 MG/5ML
3 LIQUID (ML) ORAL
Qty: 0 | Refills: 0 | DISCHARGE
Start: 2025-07-18

## 2025-07-18 RX ORDER — INSULIN LISPRO 100 U/ML
0 INJECTION, SOLUTION INTRAVENOUS; SUBCUTANEOUS
Refills: 0 | DISCHARGE

## 2025-07-18 RX ORDER — NIFEDIPINE 30 MG
1 TABLET, EXTENDED RELEASE 24 HR ORAL
Qty: 0 | Refills: 0 | DISCHARGE
Start: 2025-07-18

## 2025-07-18 RX ORDER — FERROUS SULFATE 137(45) MG
1 TABLET, EXTENDED RELEASE ORAL
Qty: 0 | Refills: 0 | DISCHARGE
Start: 2025-07-18

## 2025-07-18 RX ADMIN — Medication 600 MILLIGRAM(S): at 11:55

## 2025-07-18 RX ADMIN — Medication 975 MILLIGRAM(S): at 09:51

## 2025-07-18 RX ADMIN — Medication 975 MILLIGRAM(S): at 15:40

## 2025-07-18 RX ADMIN — Medication 975 MILLIGRAM(S): at 08:51

## 2025-07-18 RX ADMIN — Medication 975 MILLIGRAM(S): at 03:55

## 2025-07-18 RX ADMIN — Medication 975 MILLIGRAM(S): at 03:12

## 2025-07-18 RX ADMIN — HEPARIN SODIUM 10000 UNIT(S): 1000 INJECTION INTRAVENOUS; SUBCUTANEOUS at 05:36

## 2025-07-18 RX ADMIN — Medication 600 MILLIGRAM(S): at 00:29

## 2025-07-18 RX ADMIN — Medication 600 MILLIGRAM(S): at 12:55

## 2025-07-18 RX ADMIN — Medication 600 MILLIGRAM(S): at 01:00

## 2025-07-18 RX ADMIN — Medication 975 MILLIGRAM(S): at 14:40

## 2025-07-18 RX ADMIN — Medication 600 MILLIGRAM(S): at 05:36

## 2025-07-18 RX ADMIN — Medication 325 MILLIGRAM(S): at 11:55

## 2025-07-18 NOTE — PROGRESS NOTE ADULT - ASSESSMENT
Pt is a 34 y/o female 37 weeks gestation now s/p C/S.    Endocrine consulted for post-partum DM2 management.    T2DM  Obesity  DM diagnosis: Had "diabetic coma" 5 years ago  Last A1c: 14.9% at diagnosis-->5.3% (per patient pre-pregnancy)-->6.2% Jan 2025-->7.3% now  Endocrinologist: At [facility].  Home DM meds: Mounjaro pre-pregnancy. End-of-pregnancy was on NPH 22 units qhs and lispro 8-12 units    Inpt  - FS goal prior to eating <130, post prandial <180  - FS at goal  -Insulin requirements drop significantly post-partum. Hold standing insulin for now. Continue with low dose scales as below:  -Continue low dose Admelog correction scale pre-meal  -Continue low dose Admelog correction scale at bedtime  -Fingerstick BG before meals and bedtime  -Carbohydrate consistent diet  -hypoglycemia protocol    Discharge plan:  -Likely to discharge patient home on no medication for DM with close outpatient f/u. Did not tolerate metformin previously. Can consider GLP1a again depending on patient's breastfeeding plans, conception plans, etc. d/w pt that while breastfeeding that she cannot be on mounjaro, she verbalized understanding. d/w to continue to monitor her FS and if consistently >150 prior to eating to follow up with outpt provider to discuss medication to manage her diabetes. When no longer breast feeding can restart mounjaro.   -Recommend routine outpatient ophthalmology and PCP or endocrinology f/u    HLD  -Would likely benefit from a statin if no contraindication but would need reliable contraception  - check lipid panel     KATJA Heaton-BC  Nurse Practitioner  Division of Endocrinology & Diabetes  Available on Microsoft Teams

## 2025-07-18 NOTE — PROGRESS NOTE ADULT - ASSESSMENT
A/P: 41yo POD#3 s/p pLTCS i/s/o recent HSV outbreak and uncontrolled T2DM, currently on Valtrex. C/b pre-eclampsia with severe features (BPs) started on [id number] and currently on magnesium x24hrs postpartum. Course further c/b PPH with QBL of 1153. Post operatively patient with no UOP s/p 500cc + 600cc bolus with appropriate UOP increase. Patient is stable and doing well post-operatively.      #sPEC   - BP x 24 hours: BP:  (107/59 - 119/67)  -s/p Mag sulfate for seizure prophylaxis  - denies s/s of PEC - pt reports slight headache that is severe. declined blurry vision or upper abdominal pain   - baseline HELLP labs wnl  - continue taking Procardia xl 30mg daily   -continue to monitor    #HSV, active  - continue Valtrex    #type II DM   -S/p endocrine consult 7/17/25  -Cont Sliding scale in patient   -No medication at this time recommended per endo   -Recommend routine outpatient ophthalmology and PCP or endocrinology f/u      Her pain is well controlled.   She is tolerating a regular diet and passing flatus.   Denies N/V. Denies CP/SOB/lightheadedness/dizziness.   She is ambulating without difficulty.   Voiding spontaneously.    Patient is stable and doing well post-operatively.    - Continue regular diet.  - Increase ambulation.  - Continue motrin, tylenol, oxycodone PRN for pain control.   -Encourage breastfeeding.  -Encourage abdominal binder use  -Encourage incentive spirometer use  -Incisional care and PO instructions reviewed.     Discharge planning      Discussed with MD Timothy ZEPEDA   A/P: 41yo POD#3 s/p pLTCS i/s/o recent HSV outbreak and uncontrolled T2DM, currently on Valtrex. C/b pre-eclampsia with severe features (BPs) started on [id number] and currently on magnesium x24hrs postpartum. Course further c/b PPH with QBL of 1153. Post operatively patient with no UOP s/p 500cc + 600cc bolus with appropriate UOP increase. Patient is stable and doing well post-operatively.      #sPEC   - BP x 24 hours: BP:  (107/59 - 119/67)  -s/p Mag sulfate for seizure prophylaxis  - denies s/s of PEC - pt reports slight headache that is severe. declined blurry vision or upper abdominal pain  - cont tylenol, motrin, increase hydration and rest. cont to monitor for unresolved headache   - baseline HELLP labs wnl  - continue taking Procardia xl 30mg daily   -continue to monitor    #HSV, active  - continue Valtrex    #type II DM   -S/p endocrine consult 7/17/25  -Cont Sliding scale in patient   -No medication at this time recommended per endo   -Recommend routine outpatient ophthalmology and PCP or endocrinology f/u      Her pain is well controlled.   She is tolerating a regular diet and passing flatus.   Denies N/V. Denies CP/SOB/lightheadedness/dizziness.   She is ambulating without difficulty.   Voiding spontaneously.    Patient is stable and doing well post-operatively.    - Continue regular diet.  - Increase ambulation.  - Continue motrin, tylenol, oxycodone PRN for pain control.   -Encourage breastfeeding.  -Encourage abdominal binder use  -Encourage incentive spirometer use  -Incisional care and PO instructions reviewed.     Discharge planning      Discussed with MD Timothy ZEPEDA

## 2025-07-18 NOTE — PROGRESS NOTE ADULT - SUBJECTIVE AND OBJECTIVE BOX
INTERVAL HPI/OVERNIGHT EVENTS:  40y Female s/p c section under spinal anesthesia with duramorph for post op analgesia on 7/15/25    Vital Signs Last 24 Hrs  T(C): 36.4 (16 Jul 2025 09:00), Max: 37 (15 Jul 2025 14:15)  T(F): 97.6 (16 Jul 2025 09:00), Max: 98.6 (15 Jul 2025 14:15)  HR: 73 (16 Jul 2025 11:31) (59 - 86)  BP: 94/52 (16 Jul 2025 11:31) (89/46 - 166/68)  BP(mean): 67 (16 Jul 2025 05:51) (63 - 108)  RR: 18 (16 Jul 2025 11:31) (12 - 33)  SpO2: 100% (16 Jul 2025 11:31) (98% - 100%)    Parameters below as of 16 Jul 2025 05:51  Patient On (Oxygen Delivery Method): room air            Patient's overall anesthesia satisfaction: positive    Patients pain is well controlled with current regimen    No respiratory events overnight    No pruritis at this time    Patient seen and doing well     No headache      No residual numbness or weakness, sensory and motor function intact    No anesthetic complications or complaints noted or reported               
NP: Progress Note POD #3    Pt seen, examined at bedside and doing well meeting all post operative milestones. Patient bonding well with infant. Breastfeeding  Pt states mild abdominal pain. Pt denies fever, chills, chest pain, SOB, nausea, vomiting, lightheadedness, dizziness.  Pt states passing flatus    T(F): 98.1 (07-18-25 @ 09:37), Max: 98.9 (07-18-25 @ 05:50)  HR: 99 (07-18-25 @ 09:37) (71 - 101)  BP: 107/59 (07-18-25 @ 09:37) (107/59 - 119/67)  RR: 18 (07-18-25 @ 09:37) (17 - 18)  SpO2: 99% (07-18-25 @ 09:37) (99% - 100%)  Wt(kg): --  CAPILLARY BLOOD GLUCOSE    I&O's Detail      MEDICATIONS  (STANDING):  acetaminophen     Tablet .. 975 milliGRAM(s) Oral <User Schedule>  dextrose 5%. 1000 milliLiter(s) (50 mL/Hr) IV Continuous <Continuous>  dextrose 5%. 1000 milliLiter(s) (100 mL/Hr) IV Continuous <Continuous>  dextrose 50% Injectable 25 Gram(s) IV Push once  dextrose 50% Injectable 12.5 Gram(s) IV Push once  dextrose 50% Injectable 25 Gram(s) IV Push once  diphtheria/tetanus/pertussis (acellular) Vaccine (Adacel) 0.5 milliLiter(s) IntraMuscular once  ferrous    sulfate 325 milliGRAM(s) Oral daily  glucagon  Injectable 1 milliGRAM(s) IntraMuscular once  heparin   Injectable 23046 Unit(s) SubCutaneous every 12 hours  ibuprofen  Tablet. 600 milliGRAM(s) Oral every 6 hours  insulin lispro (ADMELOG) corrective regimen sliding scale   SubCutaneous three times a day before meals  insulin lispro (ADMELOG) corrective regimen sliding scale   SubCutaneous at bedtime  NIFEdipine XL 30 milliGRAM(s) Oral daily    MEDICATIONS  (PRN):  dextrose Oral Gel 15 Gram(s) Oral once PRN Blood Glucose LESS THAN 70 milliGRAM(s)/deciliter  diphenhydrAMINE 25 milliGRAM(s) Oral every 6 hours PRN Pruritus  lanolin Ointment 1 Application(s) Topical every 6 hours PRN Sore Nipples  magnesium hydroxide Suspension 30 milliLiter(s) Oral two times a day PRN Constipation  oxyCODONE    IR 5 milliGRAM(s) Oral every 3 hours PRN Moderate to Severe Pain (4-10)  oxyCODONE    IR 5 milliGRAM(s) Oral once PRN Moderate to Severe Pain (4-10)  senna 1 Tablet(s) Oral two times a day PRN Constipation  simethicone 80 milliGRAM(s) Chew every 4 hours PRN Gas      Physical Exam:  Constitutional: WDWN female, NAD AxOx3  Skin: no breakdowns noted, warm and dry  Breasts: soft, nonengorged, no reddness, no warmth bilaterally  Abdomen: Fundus firm, appropriate tenderness noted   Incision site: Incision clean and dry with dermabond intact.   GYN: Lochia light   Extremities: no lower extremity edema or calf tenderness bilaterally; intermittent compression stockings in place     LABS:             10.3   6.39  )-----------( 201      ( 07-17 @ 06:30 )             31.1                9.9    7.43  )-----------( 197      ( 07-16 @ 15:58 )             29.4                9.9    8.95  )-----------( 191      ( 07-16 @ 08:57 )             29.0                10.8   10.49 )-----------( 220      ( 07-16 @ 02:20 )             32.6                12.3   9.80  )-----------( 232      ( 07-15 @ 22:08 )             36.6       07-17    137    |  106    |  9      ----------------------------<  76     4.3     |  22     |  0.86     Ca    7.4[L]      17 Jul 2025 06:30    TPro  5.7[L]  /  Alb  2.8[L]  /  TBili  <0.2   /  DBili  x      /  AST  16     /  ALT  6      /  AlkPhos  133[H]  07-17          Urinalysis Basic - ( 17 Jul 2025 06:30 )    Color: x / Appearance: x / SG: x / pH: x  Gluc: 76 mg/dL / Ketone: x  / Bili: x / Urobili: x   Blood: x / Protein: x / Nitrite: x   Leuk Esterase: x / RBC: x / WBC x   Sq Epi: x / Non Sq Epi: x / Bacteria: x        RADIOLOGY & ADDITIONAL TESTS:        
PGY 3 Antepartum Note    Patient seen and examined at bedside in NAD. Denies any CTX, LOF or VB.  Reports good fetal movement.    T(F): 98 (05:58)  HR: 72 (05:58)  BP: 93/52 (05:58)  RR: 17 (05:58)        Gen: NAD  Cardio:  clinically well perfused  Pulm: saturating well on RA  Abd: gravid, nontender, no palpable contractions  Ext: no edema,  no calf enderness  SVE: deferred    Medications:  acetaminophen     Tablet ..: 975 milliGRAM(s) (07-14-25 @ 23:02)  insulin lispro Injectable (ADMELOG): 8 Unit(s) (07-14-25 @ 21:16)  insulin NPH human recombinant: 22 Unit(s) (07-14-25 @ 23:00)  lactated ringers Bolus: 500 mL/Hr (07-15-25 @ 02:09)  valACYclovir: 500 milliGRAM(s) (07-14-25 @ 23:02)      Labs:                        13.6   5.84  )-----------( 272      ( 14 Jul 2025 19:45 )             41.1                 
OB Progress Note:  Delivery, POD#1    S: 39yo POD#1 s/p pLTCS. Her pain is well controlled. Reports feelings a little cloudy since starting magnesium, denies headache, vision changes, epigastric/abdominal pain. She is tolerating a regular diet. Not yet passing flatus. Denies N/V. Denies CP/SOB/lightheadedness/dizziness.  She is ambulating without difficulty.     O:   Vital Signs Last 24 Hrs  T(C): 36.5 (2025 05:51), Max: 37.1 (15 Jul 2025 09:57)  T(F): 97.7 (2025 05:51), Max: 98.8 (15 Jul 2025 09:57)  HR: 72 (2025 05:51) (59 - 100)  BP: 96/53 (2025 05:51) (90/50 - 166/68)  BP(mean): 67 (2025 05:51) (63 - 108)  RR: 18 (2025 05:51) (12 - 33)  SpO2: 100% (2025 05:51) (98% - 100%)    Parameters below as of 2025 05:51  Patient On (Oxygen Delivery Method): room air        Labs:  Blood type: O Positive  Rubella IgG: RPR: Negative                          10.8[L]   10.49 >-----------< 220    (  @ 02:20 )             32.6[L]                        12.3   9.80 >-----------< 232    ( 07-15 @ 22:08 )             36.6                        13.6   5.84 >-----------< 272    (  @ 19:45 )             41.1    25 @ 02:20      138  |  104  |  8   ----------------------------<  166[H]  4.4   |  20[L]  |  0.78    07-15-25 @ 22:08      141  |  107  |  8   ----------------------------<  79  4.7   |  22  |  0.73        Ca    8.2[L]      2025 02:20  Ca    8.8      15 Jul 2025 22:08  Mg     4.30[H]         TPro  5.4[L]  /  Alb  2.7[L]  /  TBili  <0.2  /  DBili  x   /  AST  20  /  ALT  9   /  AlkPhos  141[H]  25 @ 02:20  TPro  5.9[L]  /  Alb  2.8[L]  /  TBili  <0.2  /  DBili  x   /  AST  19  /  ALT  9   /  AlkPhos  159[H]  07-15-25 @ 22:08          Physical exam:  Gen: NAD  Abdomen: Soft, appropriately tender, Mildly distended, firm uterine fundus at umbilicus.  Incision: Clean, dry, and intact dressed with dermabond   Pelvic: Normal lochia noted  : Gregorio in place with light yellow urine in gregorio bag  Ext: No calf tenderness, no erythema, no pitting edema  
Post-Operative Note, C/S  She is a  40y woman who is now post-operative day:2    Subjective:  The patient feels well.  She is ambulating.   She is tolerating regular diet.  She denies nausea and vomiting; denies fever.  She is voiding.  Her pain is controlled; incisional pain is appropriate.  She reports normal postpartum bleeding.  She is breastfeeding.  She is formula feeding.    Physical exam:    Vital Signs Last 24 Hrs  T(C): 36.7 (17 Jul 2025 09:20), Max: 36.8 (17 Jul 2025 06:00)  T(F): 98 (17 Jul 2025 09:20), Max: 98.3 (17 Jul 2025 06:00)  HR: 78 (17 Jul 2025 14:06) (67 - 93)  BP: 119/67 (17 Jul 2025 14:06) (92/58 - 119/67)  BP(mean): 63 (16 Jul 2025 22:34) (63 - 72)  RR: 18 (17 Jul 2025 09:20) (18 - 19)  SpO2: 100% (17 Jul 2025 09:20) (99% - 100%)    Parameters below as of 17 Jul 2025 06:00  Patient On (Oxygen Delivery Method): room air        Gen: NAD  Breast: Soft, nontender, not engorged.  Abdomen: Soft, nontender, no distension , firm uterine fundus at umbilicus.  Incision: C/D/I.  Pelvic: Normal lochia noted  Ext: No calf tenderness    LABS:                        10.3   6.39  )-----------( 201      ( 17 Jul 2025 06:30 )             31.1       Rubella status:     Allergies    No Known Allergies    Intolerances      MEDICATIONS  (STANDING):  acetaminophen     Tablet .. 975 milliGRAM(s) Oral <User Schedule>  dextrose 5%. 1000 milliLiter(s) (50 mL/Hr) IV Continuous <Continuous>  dextrose 5%. 1000 milliLiter(s) (100 mL/Hr) IV Continuous <Continuous>  dextrose 50% Injectable 25 Gram(s) IV Push once  dextrose 50% Injectable 12.5 Gram(s) IV Push once  dextrose 50% Injectable 25 Gram(s) IV Push once  diphtheria/tetanus/pertussis (acellular) Vaccine (Adacel) 0.5 milliLiter(s) IntraMuscular once  ferrous    sulfate 325 milliGRAM(s) Oral daily  glucagon  Injectable 1 milliGRAM(s) IntraMuscular once  heparin   Injectable 28620 Unit(s) SubCutaneous every 12 hours  ibuprofen  Tablet. 600 milliGRAM(s) Oral every 6 hours  insulin lispro (ADMELOG) corrective regimen sliding scale   SubCutaneous three times a day before meals  insulin lispro (ADMELOG) corrective regimen sliding scale   SubCutaneous at bedtime  NIFEdipine XL 30 milliGRAM(s) Oral daily    MEDICATIONS  (PRN):  dextrose Oral Gel 15 Gram(s) Oral once PRN Blood Glucose LESS THAN 70 milliGRAM(s)/deciliter  diphenhydrAMINE 25 milliGRAM(s) Oral every 6 hours PRN Pruritus  lanolin Ointment 1 Application(s) Topical every 6 hours PRN Sore Nipples  magnesium hydroxide Suspension 30 milliLiter(s) Oral two times a day PRN Constipation  oxyCODONE    IR 5 milliGRAM(s) Oral once PRN Moderate to Severe Pain (4-10)  oxyCODONE    IR 5 milliGRAM(s) Oral every 3 hours PRN Moderate to Severe Pain (4-10)  senna 1 Tablet(s) Oral two times a day PRN Constipation  simethicone 80 milliGRAM(s) Chew every 4 hours PRN Gas            
    Chief Complaint: post partum type 2 DM    History: Tolerating PO diet. FS within goal and stable.     MEDICATIONS  (STANDING):  acetaminophen     Tablet .. 975 milliGRAM(s) Oral <User Schedule>  dextrose 5%. 1000 milliLiter(s) (50 mL/Hr) IV Continuous <Continuous>  dextrose 5%. 1000 milliLiter(s) (100 mL/Hr) IV Continuous <Continuous>  dextrose 50% Injectable 25 Gram(s) IV Push once  dextrose 50% Injectable 12.5 Gram(s) IV Push once  dextrose 50% Injectable 25 Gram(s) IV Push once  diphtheria/tetanus/pertussis (acellular) Vaccine (Adacel) 0.5 milliLiter(s) IntraMuscular once  ferrous    sulfate 325 milliGRAM(s) Oral daily  glucagon  Injectable 1 milliGRAM(s) IntraMuscular once  heparin   Injectable 82829 Unit(s) SubCutaneous every 12 hours  ibuprofen  Tablet. 600 milliGRAM(s) Oral every 6 hours  insulin lispro (ADMELOG) corrective regimen sliding scale   SubCutaneous three times a day before meals  insulin lispro (ADMELOG) corrective regimen sliding scale   SubCutaneous at bedtime  NIFEdipine XL 30 milliGRAM(s) Oral daily    MEDICATIONS  (PRN):  dextrose Oral Gel 15 Gram(s) Oral once PRN Blood Glucose LESS THAN 70 milliGRAM(s)/deciliter  diphenhydrAMINE 25 milliGRAM(s) Oral every 6 hours PRN Pruritus  lanolin Ointment 1 Application(s) Topical every 6 hours PRN Sore Nipples  magnesium hydroxide Suspension 30 milliLiter(s) Oral two times a day PRN Constipation  oxyCODONE    IR 5 milliGRAM(s) Oral every 3 hours PRN Moderate to Severe Pain (4-10)  oxyCODONE    IR 5 milliGRAM(s) Oral once PRN Moderate to Severe Pain (4-10)  senna 1 Tablet(s) Oral two times a day PRN Constipation  simethicone 80 milliGRAM(s) Chew every 4 hours PRN Gas      Allergies    No Known Allergies    Intolerances      Review of Systems:  Cardiovascular: No chest pain, palpitations  Respiratory: No SOB, no cough  GI: No nausea, vomiting, abdominal pain  : No dysuria  Endocrine: no polyuria, polydipsia      PHYSICAL EXAM:  VITALS: T(C): 36.7 (07-18-25 @ 09:37)  T(F): 98.1 (07-18-25 @ 09:37), Max: 98.9 (07-18-25 @ 05:50)  HR: 99 (07-18-25 @ 09:37) (71 - 101)  BP: 107/59 (07-18-25 @ 09:37) (107/59 - 119/67)  RR:  (17 - 18)  SpO2:  (99% - 100%)  Wt(kg): --  GENERAL: NAD, well-groomed, well-developed  EYES: No proptosis  HEENT:  Atraumatic, Normocephalic  RESPIRATORY: non labored breathing   CARDIOVASCULAR: Regular rate and rhythm  GI: Soft, nontender, non distended  PSYCH: Alert and oriented x 3, normal affect, normal mood       CAPILLARY BLOOD GLUCOSE      POCT Blood Glucose.: 98 mg/dL (18 Jul 2025 11:40)  POCT Blood Glucose.: 95 mg/dL (18 Jul 2025 07:51)  POCT Blood Glucose.: 119 mg/dL (17 Jul 2025 22:59)  POCT Blood Glucose.: 76 mg/dL (17 Jul 2025 17:28)      07-17    137  |  106  |  9   ----------------------------<  76  4.3   |  22  |  0.86    eGFR: 86    Ca    7.4[L]      07-17  Mg     5.80     07-16    TPro  5.7[L]  /  Alb  2.8[L]  /  TBili  <0.2  /  DBili  x   /  AST  16  /  ALT  6   /  AlkPhos  133[H]  07-17          Thyroid Function Tests:        A1C with Estimated Average Glucose Result: 7.3 % (07-15-25 @ 06:05)          Diet, Regular:   Consistent Carbohydrate Evening Snack (CSTCHOSN) (07-15-25 @ 20:07)

## 2025-07-30 ENCOUNTER — APPOINTMENT (OUTPATIENT)
Dept: MATERNAL FETAL MEDICINE | Facility: CLINIC | Age: 40
End: 2025-07-30

## (undated) DEVICE — DRSG DERMABOND PRINEO 22CM